# Patient Record
Sex: FEMALE | Race: WHITE | NOT HISPANIC OR LATINO | Employment: FULL TIME | URBAN - METROPOLITAN AREA
[De-identification: names, ages, dates, MRNs, and addresses within clinical notes are randomized per-mention and may not be internally consistent; named-entity substitution may affect disease eponyms.]

---
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
## 2021-01-18 ENCOUNTER — OFFICE VISIT (OUTPATIENT)
Dept: FAMILY MEDICINE | Facility: CLINIC | Age: 21
End: 2021-01-18
Payer: COMMERCIAL

## 2021-01-18 VITALS
OXYGEN SATURATION: 98 % | BODY MASS INDEX: 27.85 KG/M2 | RESPIRATION RATE: 14 BRPM | WEIGHT: 163.5 LBS | HEART RATE: 110 BPM | TEMPERATURE: 98.3 F | SYSTOLIC BLOOD PRESSURE: 100 MMHG | DIASTOLIC BLOOD PRESSURE: 72 MMHG

## 2021-01-18 DIAGNOSIS — Z23 NEED FOR PROPHYLACTIC VACCINATION AND INOCULATION AGAINST INFLUENZA: ICD-10-CM

## 2021-01-18 DIAGNOSIS — Z11.3 ROUTINE SCREENING FOR STI (SEXUALLY TRANSMITTED INFECTION): ICD-10-CM

## 2021-01-18 DIAGNOSIS — N89.8 VAGINAL DISCHARGE: Primary | ICD-10-CM

## 2021-01-18 LAB
HCV AB SERPL QL IA: NONREACTIVE
HIV 1+2 AB+HIV1 P24 AG SERPL QL IA: NONREACTIVE
SPECIMEN SOURCE: NORMAL
T PALLIDUM AB SER QL: NONREACTIVE
WET PREP SPEC: NORMAL

## 2021-01-18 PROCEDURE — 87210 SMEAR WET MOUNT SALINE/INK: CPT | Performed by: FAMILY MEDICINE

## 2021-01-18 PROCEDURE — 87591 N.GONORRHOEAE DNA AMP PROB: CPT | Performed by: FAMILY MEDICINE

## 2021-01-18 PROCEDURE — 90686 IIV4 VACC NO PRSV 0.5 ML IM: CPT | Performed by: FAMILY MEDICINE

## 2021-01-18 PROCEDURE — 90471 IMMUNIZATION ADMIN: CPT | Performed by: FAMILY MEDICINE

## 2021-01-18 PROCEDURE — 86803 HEPATITIS C AB TEST: CPT | Performed by: FAMILY MEDICINE

## 2021-01-18 PROCEDURE — 36415 COLL VENOUS BLD VENIPUNCTURE: CPT | Performed by: FAMILY MEDICINE

## 2021-01-18 PROCEDURE — 86780 TREPONEMA PALLIDUM: CPT | Mod: 90 | Performed by: FAMILY MEDICINE

## 2021-01-18 PROCEDURE — 87389 HIV-1 AG W/HIV-1&-2 AB AG IA: CPT | Performed by: FAMILY MEDICINE

## 2021-01-18 PROCEDURE — 99000 SPECIMEN HANDLING OFFICE-LAB: CPT | Performed by: FAMILY MEDICINE

## 2021-01-18 PROCEDURE — 87491 CHLMYD TRACH DNA AMP PROBE: CPT | Performed by: FAMILY MEDICINE

## 2021-01-18 PROCEDURE — 99213 OFFICE O/P EST LOW 20 MIN: CPT | Mod: 25 | Performed by: FAMILY MEDICINE

## 2021-01-18 NOTE — PATIENT INSTRUCTIONS
Did you know?   I do telehealth (video) visits exclusively on  and .  I still do in-person visits at [facility] on  and .  You can schedule a video visit for follow-up appointments as well as future appointments for certain conditions.  Please see the below link.  [email or web address]    If you have not already done so,  I encourage you to sign up for [email or web address]).  This will allow you to review your results, securely communicate with a provider, and schedule virtual visits as well.    If you are due for a mammogram or colonoscopy please call the [facility] scheduling number: [phone].     To schedule any ordered imaging studies you can call [facility] [phone].

## 2021-01-18 NOTE — PROGRESS NOTES
Assessment & Plan     Vaginal discharge  Normal wet prep.  We'll re-screen for gonorrhea/chlamydia.    - Wet prep  - NEISSERIA GONORRHOEA PCR  - CHLAMYDIA TRACHOMATIS PCR    Routine screening for STI (sexually transmitted infection)  We'll screen for other STI's  - Treponema Abs w Reflex to RPR and Titer  - HIV Antigen Antibody Combo  - Hepatitis C Screen Reflex to HCV RNA Quant and Genotype    Need for prophylactic vaccination and inoculation against influenza  - INFLUENZA VACCINE IM > 6 MONTHS VALENT IIV4 [21759]  - ADMIN 1st VACCINE         No follow-ups on file.    Dinorah Thomas MD  [facility]        Lillian Mckeon is a 20 year old who presents to clinic today for the following health issues     HPI     Vaginal Symptoms      Duration: 1 week     Description  vaginal discharge - creamy yellow and odor    Intensity:  moderate    Accompanying signs and symptoms (fever/dysuria/abdominal or back pain): None    History  Sexually active: yes, single partner, contraception - oral contraceptives (combined)  Possibility of pregnancy: No  Recent antibiotic use: YES- 2 weeks ago    Precipitating or alleviating factors: None    Therapies tried and outcome: rx- for yeast        Was diagnosed with chlamydia Nov 16 and was treated with azithro.  Her partner was also tested (positive) and treated at that time.    Since then she's had several episodes of yeast and BV and has been on several alternating courses of oral diflucan and oral metronidazole.  Most recently this was about 2 weeks ago when she took 2 doses of oral diflucan 3 days apart.    She was re-tested for gonorrhea/chlamydia a month after her positive and she tested negative at that time.  Her partner was also retested and was negative on re-test.  However, she notes that he has complained of dysuria.      She denies vagina itching and irritation but notes a more copious discharge      Review of Systems         Objective    BP  100/72 (BP Location: Left arm, Patient Position: Chair, Cuff Size: Adult Regular)   Pulse 110   Temp 98.3  F (36.8  C) (Oral)   Resp 14   Wt 74.2 kg (163 lb 8 oz)   SpO2 98%   BMI 27.85 kg/m    Body mass index is 27.85 kg/m .  Physical Exam   GEN:  no apparent distress      Results for orders placed or performed in visit on 01/18/21 (from the past 24 hour(s))   Wet prep    Specimen: Vagina   Result Value Ref Range    Specimen Description Vagina     Wet Prep Moderate  WBC'S seen       Wet Prep No Trichomonas seen     Wet Prep No clue cells seen     Wet Prep No yeast seen

## 2021-01-19 NOTE — RESULT ENCOUNTER NOTE
"Hi Linda,  OK, your treponemal antibodies (syphilis blood test) and gonorrhea/chlamydia are all negative.      I can't explain the change in vaginal discharge.  It could be related to the change in the pill.      Given your tendency to develop bacterial vaginosis and yeast infections I recommend that you eat yogurt or take an oral probiotic daily (to keep putting good bacteria into your system).  I also recommend that you practice good vulvar hygiene which includes:   Laundry  Use \"free and clear\" versions of your laundry detergent (such as All or Tide).      If available, add a second rinse when washing your underwear.    Avoid fabric softeners and dryer sheets.      Clothing  Wear white all-cotton underwear.    Do not wear nylon underwear, even if it has a cotton crotch.    Do not wear thongs, even if cotton.    When sleeping avoid wearing underwear. Instead wear loose-fitting bottoms such as cotton boxers or shorts.    Avoid pantyhose.    Avoid tight clothing.      Bathing  Do not use bubble bath, bath salts, scented oils or any other bath products that contain perfume.   For general washing use a gentle soap such as Dove, Neutrogena, Basis, or Cetaphil.    Do not douche.    Feminine products  Avoid pads or tampons that have a deoderant.    Only use pads with a cotton liner (not a nylon mesh weave).      Shaving/waxing  Do not wax, shave with a razor, or use hair removal products on your vulvar area.    Use a scissors or electric clippers to trim pubic hair.    Laser hair removal also is a safe option.      Chronic dampness  Apply Gold Bond body powder or Zeasorb antifungal powder to your vulva and groin area 1-2 times daily to help absorb moisture.    Do not use powders with cornstarch or talcum.  Change underwear during the day as needed.  Avoid daily use of pads when possible.     Dinorah Thomas MD"

## 2021-03-01 ENCOUNTER — OFFICE VISIT (OUTPATIENT)
Dept: FAMILY MEDICINE | Facility: CLINIC | Age: 21
End: 2021-03-01
Payer: COMMERCIAL

## 2021-03-01 VITALS
BODY MASS INDEX: 29.19 KG/M2 | RESPIRATION RATE: 16 BRPM | TEMPERATURE: 98.3 F | SYSTOLIC BLOOD PRESSURE: 115 MMHG | OXYGEN SATURATION: 100 % | HEART RATE: 100 BPM | WEIGHT: 171.4 LBS | DIASTOLIC BLOOD PRESSURE: 72 MMHG

## 2021-03-01 DIAGNOSIS — R30.0 DYSURIA: ICD-10-CM

## 2021-03-01 DIAGNOSIS — B96.89 BV (BACTERIAL VAGINOSIS): Primary | ICD-10-CM

## 2021-03-01 DIAGNOSIS — N76.0 BV (BACTERIAL VAGINOSIS): Primary | ICD-10-CM

## 2021-03-01 DIAGNOSIS — N94.9 FEMALE GENITAL SYMPTOMS: ICD-10-CM

## 2021-03-01 LAB
ALBUMIN UR-MCNC: NEGATIVE MG/DL
APPEARANCE UR: CLEAR
BILIRUB UR QL STRIP: NEGATIVE
COLOR UR AUTO: YELLOW
GLUCOSE UR STRIP-MCNC: NEGATIVE MG/DL
HGB UR QL STRIP: NEGATIVE
KETONES UR STRIP-MCNC: NEGATIVE MG/DL
LEUKOCYTE ESTERASE UR QL STRIP: ABNORMAL
NITRATE UR QL: NEGATIVE
NON-SQ EPI CELLS #/AREA URNS LPF: NORMAL /LPF
PH UR STRIP: 6 PH (ref 5–7)
RBC #/AREA URNS AUTO: NORMAL /HPF
SOURCE: ABNORMAL
SP GR UR STRIP: >1.03 (ref 1–1.03)
SPECIMEN SOURCE: ABNORMAL
UROBILINOGEN UR STRIP-ACNC: 0.2 EU/DL (ref 0.2–1)
WBC #/AREA URNS AUTO: NORMAL /HPF
WET PREP SPEC: ABNORMAL

## 2021-03-01 PROCEDURE — 99213 OFFICE O/P EST LOW 20 MIN: CPT | Performed by: NURSE PRACTITIONER

## 2021-03-01 PROCEDURE — 81001 URINALYSIS AUTO W/SCOPE: CPT | Performed by: NURSE PRACTITIONER

## 2021-03-01 PROCEDURE — 87210 SMEAR WET MOUNT SALINE/INK: CPT | Performed by: NURSE PRACTITIONER

## 2021-03-01 RX ORDER — FLUCONAZOLE 150 MG/1
TABLET ORAL
Qty: 3 TABLET | Refills: 0 | Status: SHIPPED | OUTPATIENT
Start: 2021-03-01 | End: 2021-03-02

## 2021-03-01 RX ORDER — METRONIDAZOLE 500 MG/1
500 TABLET ORAL 2 TIMES DAILY
Qty: 14 TABLET | Refills: 0 | Status: SHIPPED | OUTPATIENT
Start: 2021-03-01 | End: 2021-03-08

## 2021-03-01 RX ORDER — METRONIDAZOLE 7.5 MG/G
GEL VAGINAL
Qty: 70 G | Status: SHIPPED | OUTPATIENT
Start: 2021-03-01 | End: 2021-05-24

## 2021-03-01 NOTE — PROGRESS NOTES
Assessment & Plan     BV (bacterial vaginosis)  Will treat with Flagyl 500 mg BID x 7 days, then start metrogel twice weekly for 4 months for recurrent BV.  Discussed the use and indication of this medication as well as potential side effects.   Consider boric acid suppositories or referral to gyn if this is not helpful.   - metroNIDAZOLE (FLAGYL) 500 MG tablet; Take 1 tablet (500 mg) by mouth 2 times daily for 7 days  - metroNIDAZOLE (METROGEL) 0.75 % vaginal gel; Use 1 applicator vaginally twice weekly for 4 months    Female genital symptoms  To take if she develops symptoms of a yeast infection from metronidazole.  - fluconazole (DIFLUCAN) 150 MG tablet; Take one tablet once, may repeat after 3 days if needed for a total of 3 doses if needed    Dysuria  UA is negative.   - Wet prep  - *UA reflex to Microscopic and Culture ([facility] [facility] (except [place] and [place])  - Urine Microscopic      No follow-ups on file.    Daysi August NP  [facility]    Lillian Mckeon is a 20 year old who presents for the following health issues     HPI       Vaginal Symptoms  Onset/Duration: off and on 2 weeks   Description:  Vaginal Discharge: white   Itching (Pruritis): YES slight  Burning sensation:  YES  Odor: YES  Accompanying Signs & Symptoms:  Urinary symptoms: no  Abdominal pain: no  Fever: no  History:   Sexually active: no  New Partner: no  Possibility of Pregnancy:  no  Recent antibiotic use: no  Previous vaginitis issues: no  Precipitating or alleviating factors: None    She was treated for chlamydia in November and since then, she has had recurrent BV and yeast infections.  She was in last month, negative wet prep.  She has retested negative for Chlamydia.        Review of Systems         Objective    /72   Pulse 100   Temp 98.3  F (36.8  C) (Oral)   Resp 16   Wt 77.7 kg (171 lb 6.4 oz)   SpO2 100%   BMI 29.19 kg/m    Body mass index is 29.19  kg/m .  Physical Exam   GENERAL: healthy, alert and no distress  PSYCH: mentation appears normal, affect normal/bright

## 2021-03-16 DIAGNOSIS — N94.3 PMS (PREMENSTRUAL SYNDROME): ICD-10-CM

## 2021-03-16 DIAGNOSIS — F33.1 MAJOR DEPRESSIVE DISORDER, RECURRENT EPISODE, MODERATE (H): ICD-10-CM

## 2021-03-16 RX ORDER — DULOXETIN HYDROCHLORIDE 30 MG/1
60 CAPSULE, DELAYED RELEASE ORAL DAILY
Status: CANCELLED | OUTPATIENT
Start: 2021-03-16

## 2021-03-17 ENCOUNTER — HOSPITAL ENCOUNTER (EMERGENCY)
Facility: CLINIC | Age: 21
Discharge: HOME | End: 2021-03-17
Attending: PSYCHIATRY & NEUROLOGY | Admitting: PSYCHIATRY & NEUROLOGY
Payer: COMMERCIAL

## 2021-03-17 VITALS
RESPIRATION RATE: 16 BRPM | OXYGEN SATURATION: 100 % | SYSTOLIC BLOOD PRESSURE: 114 MMHG | TEMPERATURE: 97.7 F | DIASTOLIC BLOOD PRESSURE: 72 MMHG | HEART RATE: 82 BPM

## 2021-03-17 DIAGNOSIS — F41.8 DEPRESSION WITH ANXIETY: ICD-10-CM

## 2021-03-17 LAB
AMPHETAMINES UR QL SCN: NEGATIVE
BARBITURATES UR QL: NEGATIVE
BENZODIAZ UR QL: NEGATIVE
CANNABINOIDS UR QL SCN: NEGATIVE
COCAINE UR QL: NEGATIVE
ETHANOL UR QL SCN: NEGATIVE
HCG UR QL: NEGATIVE
OPIATES UR QL SCN: NEGATIVE

## 2021-03-17 PROCEDURE — 99285 EMERGENCY DEPT VISIT HI MDM: CPT | Mod: 25 | Performed by: PSYCHIATRY & NEUROLOGY

## 2021-03-17 PROCEDURE — 81025 URINE PREGNANCY TEST: CPT | Performed by: PSYCHIATRY & NEUROLOGY

## 2021-03-17 PROCEDURE — 90791 PSYCH DIAGNOSTIC EVALUATION: CPT

## 2021-03-17 PROCEDURE — 99283 EMERGENCY DEPT VISIT LOW MDM: CPT | Performed by: PSYCHIATRY & NEUROLOGY

## 2021-03-17 PROCEDURE — 80307 DRUG TEST PRSMV CHEM ANLYZR: CPT | Performed by: PSYCHIATRY & NEUROLOGY

## 2021-03-17 PROCEDURE — 80320 DRUG SCREEN QUANTALCOHOLS: CPT | Performed by: PSYCHIATRY & NEUROLOGY

## 2021-03-17 ASSESSMENT — ENCOUNTER SYMPTOMS
ABDOMINAL PAIN: 0
SHORTNESS OF BREATH: 0
DYSPHORIC MOOD: 1
BACK PAIN: 0
FEVER: 0
CHEST TIGHTNESS: 0
DIZZINESS: 0
HALLUCINATIONS: 0
NERVOUS/ANXIOUS: 1

## 2021-03-18 NOTE — TELEPHONE ENCOUNTER
"Cymbalta historical    Patient was in ER for Suicidal thoughts--see note    \"Also, there was a pharmacy snafu causing her to miss 5 days of her duloxetine.  She got her first dose again today.  She was more anxious, tearful and labile without the medication. \"    Spoke wit patient who gets Cymbalta filled by Psychiatrist. Patient has appt.with her Psychiatrist on 4/1 and also mentions having a therapist that she is following up with. Patient was given refill of Cymbalta and has enough to get to appt. Patient declines need for appt. With PCP at this time.  Patient denies suicidal thoughts and states is doing ok.    Patient encouraged to reach out to clinic RN's if Cymbalta refill issue occurs again and if any SI or questions or concerns.     Patient in agreement with plan and verbalizes understanding.   Thanks!   Steph Petty RN         "

## 2021-03-18 NOTE — ED PROVIDER NOTES
ED Provider Note  [facility]      History     Chief Complaint   Patient presents with     Suicidal     reports suicidal thoughts of overdosing; no plans; worsening depression and anxiety, recent breakup, stress with work, mother is leaving again     The history is provided by the patient and medical records.     Linda Mckinnon is a 20 year old female who comes in due to her feeling bad the last 2 days.  She feels she had a breakdown.  She recently broke up with her boyfriend, has little family support and works long hours.  She got some suicidal thoughts last night, no plan or intent.  She did superficially cut herself on her leg.  It does not need any medical attention.  She is not currently suicidal.  Also, there was a pharmacy snafu causing her to miss 5 days of her duloxetine.  She got her first dose again today.  She was more anxious, tearful and labile without the medication.      Please see the 's assessment in EPIC from today (3/17/21) for further details.    Past Medical History  Past Medical History:   Diagnosis Date     Abdominal pain 8/7/2011    Lab work up negative-better with enema and miralax likely dx constipation      Allergic rhinitis      Depressive disorder      Flank pain 3/20/2014     Generalized anxiety disorder      Major depression      Pain in joint, shoulder region 2/25/2015     Pale 12/26/2013     Vesicoureteral reflux with reflux nephropathy, bilateral     resolved 8/04- normal RNC     Past Surgical History:   Procedure Laterality Date     NO HISTORY OF SURGERY       DASETTA 1/35 1-35 MG-MCG tablet  DULoxetine (CYMBALTA) 30 MG capsule  fluticasone (FLONASE) 50 MCG/ACT nasal spray  hydrOXYzine (VISTARIL) 25 MG capsule  ibuprofen (ADVIL,MOTRIN) 600 MG tablet  metroNIDAZOLE (METROGEL) 0.75 % vaginal gel      Allergies   Allergen Reactions     Penicillins      Body aches, nausea, stomach hurts     Family History  Family History   Problem Relation Age of  Onset     Gallbladder Disease Maternal Grandmother      Colon Cancer Maternal Grandfather      Colon Polyps Maternal Grandfather      Genitourinary Problems No family hx of      Social History   Social History     Tobacco Use     Smoking status: Never Smoker     Smokeless tobacco: Never Used     Tobacco comment: non smoking house    Substance Use Topics     Alcohol use: No     Drug use: No      Past medical history, past surgical history, medications, allergies, family history, and social history were reviewed with the patient. No additional pertinent items.       Review of Systems   Constitutional: Negative for fever.   Eyes: Negative for visual disturbance.   Respiratory: Negative for chest tightness and shortness of breath.    Cardiovascular: Negative for chest pain.   Gastrointestinal: Negative for abdominal pain.   Musculoskeletal: Negative for back pain.   Neurological: Negative for dizziness.   Psychiatric/Behavioral: Positive for dysphoric mood, self-injury and suicidal ideas (off and on). Negative for hallucinations. The patient is nervous/anxious.    All other systems reviewed and are negative.    A complete review of systems was performed with pertinent positives and negatives noted in the HPI, and all other systems negative.    Physical Exam   BP: 131/83  Pulse: 115  Temp: 97.7  F (36.5  C)  Resp: 16  SpO2: 100 %  Physical Exam  Vitals signs and nursing note reviewed.   Constitutional:       Appearance: Normal appearance. She is well-developed.   Cardiovascular:      Rate and Rhythm: Normal rate and regular rhythm.      Heart sounds: Normal heart sounds.   Pulmonary:      Effort: Pulmonary effort is normal. No respiratory distress.      Breath sounds: Normal breath sounds.   Neurological:      Mental Status: She is alert and oriented to person, place, and time.   Psychiatric:         Attention and Perception: Attention and perception normal.         Mood and Affect: Affect normal. Mood is anxious and  depressed.         Speech: Speech normal.         Behavior: Behavior normal. Behavior is cooperative.         Thought Content: Thought content normal. Thought content is not paranoid or delusional. Thought content does not include homicidal or suicidal ideation. Thought content does not include homicidal or suicidal plan.         Cognition and Memory: Cognition and memory normal.         Judgment: Judgment normal.      Comments: Linda is a 19 y/o female who looks her age.  She is well groomed with good eye contact.          ED Course      Procedures             Results for orders placed or performed during the hospital encounter of 03/17/21   Drug abuse screen 6 urine (tox)     Status: None   Result Value Ref Range    Amphetamine Qual Urine Negative NEG^Negative    Barbiturates Qual Urine Negative NEG^Negative    Benzodiazepine Qual Urine Negative NEG^Negative    Cannabinoids Qual Urine Negative NEG^Negative    Cocaine Qual Urine Negative NEG^Negative    Ethanol Qual Urine Negative NEG^Negative    Opiates Qualitative Urine Negative NEG^Negative   HCG qualitative urine     Status: None   Result Value Ref Range    HCG Qual Urine Negative NEG^Negative     Medications - No data to display     Assessments & Plan (with Medical Decision Making)   Linda will be discharged home.  She is not an imminent risk to herself or others.  Her symptoms stem from her withdrawal from duloxetine in addition to her stressors.  She now feels safe and better after talking.  She will follow up with her therapist sooner than scheduled and is confident that her therapist will be able to get her in.  She had her first dose of duloxetine today and each day that she takes it, she should feel better.      I have reviewed the nursing notes. I have reviewed the findings, diagnosis, plan and need for follow up with the patient.    New Prescriptions    No medications on file       Final diagnoses:   Depression with anxiety       --  Wili Palma  MD  [facility] EMERGENCY DEPARTMENT  3/17/2021     Wili Palma MD  03/17/21 [id number]

## 2021-03-18 NOTE — DISCHARGE INSTRUCTIONS
Continue to take your medications    Call you therapist for a sooner appointment than what is scheduled

## 2021-03-22 ENCOUNTER — OFFICE VISIT (OUTPATIENT)
Dept: URGENT CARE | Facility: URGENT CARE | Age: 21
End: 2021-03-22
Payer: COMMERCIAL

## 2021-03-22 VITALS
HEART RATE: 90 BPM | BODY MASS INDEX: 28.95 KG/M2 | SYSTOLIC BLOOD PRESSURE: 114 MMHG | WEIGHT: 170 LBS | TEMPERATURE: 98.3 F | DIASTOLIC BLOOD PRESSURE: 70 MMHG

## 2021-03-22 DIAGNOSIS — N89.8 VAGINAL DISCHARGE: Primary | ICD-10-CM

## 2021-03-22 DIAGNOSIS — R30.0 DYSURIA: ICD-10-CM

## 2021-03-22 LAB
ALBUMIN UR-MCNC: NEGATIVE MG/DL
AMORPH CRY #/AREA URNS HPF: ABNORMAL /HPF
APPEARANCE UR: ABNORMAL
BILIRUB UR QL STRIP: NEGATIVE
COLOR UR AUTO: YELLOW
GLUCOSE UR STRIP-MCNC: NEGATIVE MG/DL
HGB UR QL STRIP: NEGATIVE
KETONES UR STRIP-MCNC: NEGATIVE MG/DL
LEUKOCYTE ESTERASE UR QL STRIP: NEGATIVE
NITRATE UR QL: NEGATIVE
PH UR STRIP: 7 PH (ref 5–7)
RBC #/AREA URNS AUTO: ABNORMAL /HPF
SOURCE: ABNORMAL
SP GR UR STRIP: >1.03 (ref 1–1.03)
SPECIMEN SOURCE: NORMAL
UROBILINOGEN UR STRIP-ACNC: 2 EU/DL (ref 0.2–1)
WBC #/AREA URNS AUTO: ABNORMAL /HPF
WET PREP SPEC: NORMAL

## 2021-03-22 PROCEDURE — 81001 URINALYSIS AUTO W/SCOPE: CPT | Performed by: NURSE PRACTITIONER

## 2021-03-22 PROCEDURE — 99213 OFFICE O/P EST LOW 20 MIN: CPT | Performed by: NURSE PRACTITIONER

## 2021-03-22 PROCEDURE — 87210 SMEAR WET MOUNT SALINE/INK: CPT | Performed by: NURSE PRACTITIONER

## 2021-03-22 PROCEDURE — 87491 CHLMYD TRACH DNA AMP PROBE: CPT | Performed by: NURSE PRACTITIONER

## 2021-03-22 PROCEDURE — 87591 N.GONORRHOEAE DNA AMP PROB: CPT | Performed by: NURSE PRACTITIONER

## 2021-03-23 NOTE — PATIENT INSTRUCTIONS
Await results of Gonorrhea and Chlamydia. No sexual intercourse until these have resulted and you've been treated if needed.

## 2021-03-23 NOTE — PROGRESS NOTES
Chief Complaint   Patient presents with     Urgent Care     Vaginal Problem     concern of BV         ICD-10-CM    1. Vaginal discharge  N89.8 Wet prep     Neisseria gonorrhoeae PCR     Chlamydia trachomatis PCR     Urine Microscopic   2. Dysuria  R30.0 *UA reflex to Microscopic and Culture ([facility] (except [place] and [place])   Will await gonorrhea and Chlamydia results and if these are negative she will follow-up with PCP or GYN provider for further exam and evaluation.  She understands to go to the emergency room if she develops abdominal pain or fever.    25 minutes spent on the date of the encounter doing chart review, history and exam, documentation and further activities as noted above    Medical Decision Making    Differential Diagnosis:  Gyn Problem: Vaginitis:  yeast, trichomonas vaginalis, bacterial vaginosis, herpes vulvitis, gonorrhea, chlamydia, urinary tract infection, STD exposure, Urinary tract infection and Retained tampon      Results for orders placed or performed in visit on 03/22/21 (from the past 24 hour(s))   Wet prep    Specimen: Vagina   Result Value Ref Range    Specimen Description Vagina     Wet Prep No clue cells seen     Wet Prep No yeast seen     Wet Prep No Trichomonas seen     Wet Prep No WBC's seen    *UA reflex to Microscopic and Culture ([facility] (except [place] and [place])    Specimen: Catheterized Urine   Result Value Ref Range    Color Urine Yellow     Appearance Urine Cloudy     Glucose Urine Negative NEG^Negative mg/dL    Bilirubin Urine Negative NEG^Negative    Ketones Urine Negative NEG^Negative mg/dL    Specific Gravity Urine >1.030 1.003 - 1.035    Blood Urine Negative NEG^Negative    pH Urine 7.0 5.0 - 7.0 pH    Protein Albumin Urine Negative NEG^Negative mg/dL    Urobilinogen Urine 2.0 (H) 0.2 - 1.0 EU/dL    Nitrite Urine Negative NEG^Negative    Leukocyte Esterase Urine Negative NEG^Negative    Source Catheterized Urine     Urine Microscopic   Result Value Ref Range    WBC Urine 0 - 5 OTO5^0 - 5 /HPF    RBC Urine O - 2 OTO2^O - 2 /HPF    Amorphous Crystals Many (A) NEG^Negative /HPF       Subjective     Linda Mckinnon is an 20 year old female who presents to clinic today for recurrent BV and she has been taking Metrogel twice a day  Over the last 6 months. She had stopped taking the metro gel a couple of weeks ago. White vaginal discharge that started 3 days ago. Last intercourse was 4 days ago. She did take a Diflucan pill yesterday but has had no improvement. She does have a fishy odor in the perineal area.      ROS: 10 point ROS neg other than the symptoms noted above in the HPI.       Objective    /70   Pulse 90   Temp 98.3  F (36.8  C) (Oral)   Wt 77.1 kg (170 lb)   Breastfeeding No   BMI 28.95 kg/m      Physical Exam       GENERAL APPEARANCE: healthy appearing, alert     EYES: PERRL, EOMI, sclera non-icteric     HENT: oral exam benign, mucus membranes intact, without ulcers or lesions     NECK: no adenopathy or asymmetry, thyroid normal to palpation     RESP: lungs clear to auscultation - no rales, rhonchi or wheezes     CV: regular rates and rhythm, no murmurs, rubs, or gallop     ABDOMEN:  soft, nontender, no HSM or masses and bowel sounds normal     GU_female: Declined     MS: extremities normal- no gross deformities noted; normal muscle tone.     SKIN: no suspicious lesions or rashes    Patient Instructions   Await results of Gonorrhea and Chlamydia. No sexual intercourse until these have resulted and you've been treated if needed.      Concepcion Mike APRN, CNP  [facility] Provider

## 2021-04-07 ENCOUNTER — MYC MEDICAL ADVICE (OUTPATIENT)
Dept: RESEARCH | Facility: CLINIC | Age: 21
End: 2021-04-07

## 2021-04-22 DIAGNOSIS — N92.1 MENOMETRORRHAGIA: ICD-10-CM

## 2021-04-22 DIAGNOSIS — Z30.09 BIRTH CONTROL COUNSELING: ICD-10-CM

## 2021-04-22 DIAGNOSIS — N94.3 PMS (PREMENSTRUAL SYNDROME): ICD-10-CM

## 2021-04-22 RX ORDER — NORETHINDRONE AND ETHINYL ESTRADIOL 1 MG-35MCG
1 KIT ORAL DAILY
Qty: 84 TABLET | Refills: 2 | Status: SHIPPED | OUTPATIENT
Start: 2021-04-22 | End: 2021-06-02

## 2021-04-28 ENCOUNTER — OFFICE VISIT (OUTPATIENT)
Dept: FAMILY MEDICINE | Facility: CLINIC | Age: 21
End: 2021-04-28
Payer: COMMERCIAL

## 2021-04-28 VITALS
TEMPERATURE: 99.6 F | DIASTOLIC BLOOD PRESSURE: 87 MMHG | SYSTOLIC BLOOD PRESSURE: 125 MMHG | HEART RATE: 108 BPM | OXYGEN SATURATION: 97 %

## 2021-04-28 DIAGNOSIS — R06.7 SNEEZING: ICD-10-CM

## 2021-04-28 DIAGNOSIS — J02.9 SORE THROAT: Primary | ICD-10-CM

## 2021-04-28 DIAGNOSIS — R09.81 NASAL CONGESTION: ICD-10-CM

## 2021-04-28 PROCEDURE — 99213 OFFICE O/P EST LOW 20 MIN: CPT | Performed by: FAMILY MEDICINE

## 2021-04-28 PROCEDURE — 99N1174 PR STATISTIC STREP A RAPID: Performed by: FAMILY MEDICINE

## 2021-04-28 PROCEDURE — 87651 STREP A DNA AMP PROBE: CPT | Performed by: FAMILY MEDICINE

## 2021-04-28 ASSESSMENT — ANXIETY QUESTIONNAIRES
6. BECOMING EASILY ANNOYED OR IRRITABLE: MORE THAN HALF THE DAYS
7. FEELING AFRAID AS IF SOMETHING AWFUL MIGHT HAPPEN: SEVERAL DAYS
1. FEELING NERVOUS, ANXIOUS, OR ON EDGE: MORE THAN HALF THE DAYS
2. NOT BEING ABLE TO STOP OR CONTROL WORRYING: MORE THAN HALF THE DAYS
5. BEING SO RESTLESS THAT IT IS HARD TO SIT STILL: SEVERAL DAYS
IF YOU CHECKED OFF ANY PROBLEMS ON THIS QUESTIONNAIRE, HOW DIFFICULT HAVE THESE PROBLEMS MADE IT FOR YOU TO DO YOUR WORK, TAKE CARE OF THINGS AT HOME, OR GET ALONG WITH OTHER PEOPLE: SOMEWHAT DIFFICULT
GAD7 TOTAL SCORE: 12
3. WORRYING TOO MUCH ABOUT DIFFERENT THINGS: MORE THAN HALF THE DAYS

## 2021-04-28 ASSESSMENT — PATIENT HEALTH QUESTIONNAIRE - PHQ9
5. POOR APPETITE OR OVEREATING: MORE THAN HALF THE DAYS
SUM OF ALL RESPONSES TO PHQ QUESTIONS 1-9: 13

## 2021-04-28 NOTE — PATIENT INSTRUCTIONS
"Discharge Instructions for COVID-19 Patients  You have--or may have--COVID-19. Please follow the instructions listed below.   If you have a weakened immune system, discuss with your doctor any other actions you need to take.  How can I protect others?  If you have symptoms (fever, cough, body aches or trouble breathing):    Stay home and away from others (self-isolate) until:  ? Your other symptoms have resolved (gotten better). And   ? You've had no fever--and no medicine that reduces fever--for 1 full day (24 hours). And   ? At least 10 days have passed since your symptoms started. (You may need to wait 20 days. Follow the advice of your care team.)  If you don't show symptoms, but testing showed that you have COVID-19:    Stay home and away from others (self-isolate) until at least 10 days have passed since the date of your first positive COVID-19 test.  During this time    Stay in your own room, even for meals. Use your own bathroom if you can.    Stay away from others in your home. No hugging, kissing or shaking hands. No visitors.    Don't go to work, school or anywhere else.    Clean \"high touch\" surfaces often (doorknobs, counters, handles). Use household cleaning spray or wipes.    You'll find a full list of  on the EPA website: www.epa.gov/pesticide-registration/list-n-disinfectants-use-against-sars-cov-2.    Cover your mouth and nose with a mask or other face covering to avoid spreading germs.    Wash your hands and face often. Use soap and water.    Caregivers in these groups are at risk for severe illness due to COVID-19:  ? People 65 years and older  ? People who live in a nursing home or long-term care facility  ? People with chronic disease (lung, heart, cancer, diabetes, kidney, liver, immunologic)  ? People who have a weakened immune system, including those who:    Are in cancer treatment    Take medicine that weakens the immune system, such as corticosteroids    Had a bone marrow or organ " transplant    Have an immune deficiency    Have poorly controlled HIV or AIDS    Are obese (body mass index of 40 or higher)    Smoke regularly    Caregivers should wear gloves while washing dishes, handling laundry and cleaning bedrooms and bathrooms.    Use caution when washing and drying laundry: Don't shake dirty laundry and use the warmest water setting that you can.    For more tips on managing your health at home, go to www.cdc.gov/coronavirus/2019-ncov/downloads/10Things.pdf.  How can I take care of myself at home?  1. Get lots of rest. Drink extra fluids (unless a doctor has told you not to).  2. Take Tylenol (acetaminophen) for fever or pain. If you have liver or kidney problems, ask your family doctor if it's okay to take Tylenol.   Adults can take either:   ? 650 mg (two 325 mg pills) every 4 to 6 hours, or   ? 1,000 mg (two 500 mg pills) every 8 hours as needed.  ? Note: Don't take more than 3,000 mg in one day. Acetaminophen is found in many medicines (both prescribed and over-the-counter medicines). Read all labels to be sure you don't take too much.   For children, check the Tylenol bottle for the right dose. The dose is based on the child's age or weight.  3. If you have other health problems (like cancer, heart failure, an organ transplant or severe kidney disease): Call your specialty clinic if you don't feel better in the next 2 days.  4. Know when to call 911. Emergency warning signs include:  ? Trouble breathing or shortness of breath  ? Pain or pressure in the chest that doesn't go away  ? Feeling confused like you haven't felt before, or not being able to wake up  ? Bluish-colored lips or face  5. Your doctor may have prescribed a blood thinner medicine. Follow their instructions.  Where can I get more information?     [employer] [facility] - About COVID-19:   [email or web address]    CDC - What to Do If You're Sick:  www.cdc.gov/coronavirus/2019-ncov/about/steps-when-sick.html    CDC - Ending Home Isolation: www.cdc.gov/coronavirus/2019-ncov/hcp/disposition-in-home-patients.html    CDC - Caring for Someone: www.cdc.gov/coronavirus/2019-ncov/if-you-are-sick/care-for-someone.html    [facility] - Interim Guidance for Hospital Discharge to Home: www.health.[facility].[place]./diseases/coronavirus/hcp/hospdischarge.pdf    Below are the COVID-19 hotlines at the [place] Department of Health ([facility]). Interpreters are available.  ? For health questions: Call 977-418-7632 or 1-549.497.4139 (7 a.m. to 7 p.m.)  ? For questions about schools and childcare: Call 571-013-1168 or 1-567.190.5313 (7 a.m. to 7 p.m.)    For informational purposes only. Not to replace the advice of your health care provider. Clinically reviewed by Dr. Tarik Kelley.   Copyright   2020 [facility]. All rights reserved. [employer] 721582 - REV 01/05/21.

## 2021-04-28 NOTE — LETTER
[facility] [address] [place] [address]  Phone: [phone]    April 28, 2021        Linda Mckinnon  [address] [facility] [address]          To whom it may concern:    RE: Linda Mckinnon    Patient was seen and treated today at our clinic and missed work due to illness. Please excuse her absence.  She has been given the following instructions:   If you have symptoms (fever, cough, body aches or trouble breathing):    Stay home and away from others (self-isolate) until:  ? Your other symptoms have resolved (gotten better). And   ? You've had no fever--and no medicine that reduces fever--for 1 full day (24 hours). And   ? At least 10 days have passed since your symptoms started.     Please contact me for questions or concerns.      Sincerely,        Dinorah Pathak MD

## 2021-04-28 NOTE — PROGRESS NOTES
"    Assessment & Plan     Sore throat, nasal congestion   ?suspect possible allergies vs viral infection. She declined COVID-19 test today, but says she will complete a saliva test in the community. Recommended self-isolation until test result returns. Can use OTC antihistamine prn   - Streptococcus A Rapid Scr w Reflx to PCR  - Group A Streptococcus PCR Throat Swab              Patient Instructions   Discharge Instructions for COVID-19 Patients  You have--or may have--COVID-19. Please follow the instructions listed below.   If you have a weakened immune system, discuss with your doctor any other actions you need to take.  How can I protect others?  If you have symptoms (fever, cough, body aches or trouble breathing):    Stay home and away from others (self-isolate) until:  ? Your other symptoms have resolved (gotten better). And   ? You've had no fever--and no medicine that reduces fever--for 1 full day (24 hours). And   ? At least 10 days have passed since your symptoms started. (You may need to wait 20 days. Follow the advice of your care team.)  If you don't show symptoms, but testing showed that you have COVID-19:    Stay home and away from others (self-isolate) until at least 10 days have passed since the date of your first positive COVID-19 test.  During this time    Stay in your own room, even for meals. Use your own bathroom if you can.    Stay away from others in your home. No hugging, kissing or shaking hands. No visitors.    Don't go to work, school or anywhere else.    Clean \"high touch\" surfaces often (doorknobs, counters, handles). Use household cleaning spray or wipes.    You'll find a full list of  on the EPA website: www.epa.gov/pesticide-registration/list-n-disinfectants-use-against-sars-cov-2.    Cover your mouth and nose with a mask or other face covering to avoid spreading germs.    Wash your hands and face often. Use soap and water.    Caregivers in these groups are at risk for severe " illness due to COVID-19:  ? People 65 years and older  ? People who live in a nursing home or long-term care facility  ? People with chronic disease (lung, heart, cancer, diabetes, kidney, liver, immunologic)  ? People who have a weakened immune system, including those who:    Are in cancer treatment    Take medicine that weakens the immune system, such as corticosteroids    Had a bone marrow or organ transplant    Have an immune deficiency    Have poorly controlled HIV or AIDS    Are obese (body mass index of 40 or higher)    Smoke regularly    Caregivers should wear gloves while washing dishes, handling laundry and cleaning bedrooms and bathrooms.    Use caution when washing and drying laundry: Don't shake dirty laundry and use the warmest water setting that you can.    For more tips on managing your health at home, go to www.cdc.gov/coronavirus/2019-ncov/downloads/10Things.pdf.  How can I take care of myself at home?  1. Get lots of rest. Drink extra fluids (unless a doctor has told you not to).  2. Take Tylenol (acetaminophen) for fever or pain. If you have liver or kidney problems, ask your family doctor if it's okay to take Tylenol.   Adults can take either:   ? 650 mg (two 325 mg pills) every 4 to 6 hours, or   ? 1,000 mg (two 500 mg pills) every 8 hours as needed.  ? Note: Don't take more than 3,000 mg in one day. Acetaminophen is found in many medicines (both prescribed and over-the-counter medicines). Read all labels to be sure you don't take too much.   For children, check the Tylenol bottle for the right dose. The dose is based on the child's age or weight.  3. If you have other health problems (like cancer, heart failure, an organ transplant or severe kidney disease): Call your specialty clinic if you don't feel better in the next 2 days.  4. Know when to call 911. Emergency warning signs include:  ? Trouble breathing or shortness of breath  ? Pain or pressure in the chest that doesn't go  away  ? Feeling confused like you haven't felt before, or not being able to wake up  ? Bluish-colored lips or face  5. Your doctor may have prescribed a blood thinner medicine. Follow their instructions.  Where can I get more information?    [facility] - About COVID-19:   [email or web address]    CDC - What to Do If You're Sick: www.cdc.gov/coronavirus/2019-ncov/about/steps-when-sick.html    CDC - Ending Home Isolation: www.cdc.gov/coronavirus/2019-ncov/hcp/disposition-in-home-patients.html    CDC - Caring for Someone: www.cdc.gov/coronavirus/2019-ncov/if-you-are-sick/care-for-someone.html    [facility] - Interim Guidance for Hospital Discharge to Home: www.health.[facility].[place]/diseases/coronavirus/hcp/hospdischarge.pdf    Below are the COVID-19 hotlines at the [place] Department of Health ([facility]). Interpreters are available.  ? For health questions: Call 251-125-4130 or 1-466.246.2536 (7 a.m. to 7 p.m.)  ? For questions about schools and childcare: Call 914-082-7635 or 1-486.307.9486 (7 a.m. to 7 p.m.)    For informational purposes only. Not to replace the advice of your health care provider. Clinically reviewed by Dr. Tarik Kelley.   Copyright   2020 [facility]. All rights reserved. [employer] [id number] - REV 01/05/21.        No follow-ups on file.    Dinorah Pathak MD, MD  [facility]    Lillian Mckeon is a 20 year old who presents for the following health issues     HPI     Acute Illness  Acute illness concerns: sore throat on right side   Onset/Duration: 2 days   Symptoms:  Fever: no  Chills/Sweats: no  Headache (location?): no  Sinus Pressure: YES-   Conjunctivitis:  no  Ear Pain: YES: right  Rhinorrhea: no  Congestion: YES  Sore Throat: YES  Cough: no  Wheeze: no  Decreased Appetite: no  Nausea: no  Vomiting: no  Diarrhea: no  Dysuria/Freq.: no  Dysuria or Hematuria: no  Fatigue/Achiness: no  Sick/Strep Exposure: no  Therapies tried and outcome:  ibuprofen helps somewhat   Additional: no covid testing done    She thinks she has allergies and wonders about treatment. She has nasal congestion, sore throat, post nasal drip, sneezing. No fever. Reports she will not do a nasal swab for COVID testing as she is very anxious about doing so. Would like to do PCR saliva test if she needs a test.        Review of Systems          Objective    /87 (BP Location: Left arm, Patient Position: Sitting, Cuff Size: Adult Regular)   Pulse 108   Temp 99.6  F (37.6  C) (Tympanic)   SpO2 97%   There is no height or weight on file to calculate BMI.  Physical Exam   GENERAL: healthy, alert and no distress  EYES: Eyes grossly normal to inspection, PERRL and conjunctivae and sclerae normal  HENT: ear canals and TM's normal, nose and mouth without ulcers or lesions  NECK: no adenopathy, no asymmetry, masses, or scars and thyroid normal to palpation  RESP: lungs clear to auscultation - no rales, rhonchi or wheezes  CV: regular rate and rhythm, normal S1 S2, no S3 or S4, no murmur, click or rub, no peripheral edema and peripheral pulses strong

## 2021-04-29 ASSESSMENT — ANXIETY QUESTIONNAIRES: GAD7 TOTAL SCORE: 12

## 2021-04-30 ENCOUNTER — OFFICE VISIT (OUTPATIENT)
Dept: URGENT CARE | Facility: URGENT CARE | Age: 21
End: 2021-04-30
Payer: COMMERCIAL

## 2021-04-30 VITALS
HEIGHT: 62 IN | WEIGHT: 170 LBS | TEMPERATURE: 98.1 F | RESPIRATION RATE: 15 BRPM | DIASTOLIC BLOOD PRESSURE: 74 MMHG | HEART RATE: 115 BPM | BODY MASS INDEX: 31.28 KG/M2 | SYSTOLIC BLOOD PRESSURE: 102 MMHG | OXYGEN SATURATION: 97 %

## 2021-04-30 DIAGNOSIS — N89.8 VAGINAL DISCHARGE: ICD-10-CM

## 2021-04-30 DIAGNOSIS — Z11.3 SCREEN FOR STD (SEXUALLY TRANSMITTED DISEASE): Primary | ICD-10-CM

## 2021-04-30 LAB
SPECIMEN SOURCE: NORMAL
WET PREP SPEC: NORMAL

## 2021-04-30 PROCEDURE — 99213 OFFICE O/P EST LOW 20 MIN: CPT | Mod: 25 | Performed by: PHYSICIAN ASSISTANT

## 2021-04-30 PROCEDURE — 87210 SMEAR WET MOUNT SALINE/INK: CPT | Performed by: PHYSICIAN ASSISTANT

## 2021-04-30 PROCEDURE — 87491 CHLMYD TRACH DNA AMP PROBE: CPT | Performed by: PHYSICIAN ASSISTANT

## 2021-04-30 PROCEDURE — 96372 THER/PROPH/DIAG INJ SC/IM: CPT | Performed by: PHYSICIAN ASSISTANT

## 2021-04-30 PROCEDURE — 87591 N.GONORRHOEAE DNA AMP PROB: CPT | Performed by: PHYSICIAN ASSISTANT

## 2021-04-30 RX ORDER — AZITHROMYCIN 500 MG/1
250 TABLET, FILM COATED ORAL ONCE
Status: CANCELLED | OUTPATIENT
Start: 2021-04-30 | End: 2021-04-30

## 2021-04-30 RX ORDER — AZITHROMYCIN 250 MG/1
1000 TABLET, FILM COATED ORAL ONCE
Qty: 4 TABLET | Refills: 0 | Status: SHIPPED | OUTPATIENT
Start: 2021-04-30 | End: 2021-04-30

## 2021-04-30 ASSESSMENT — MIFFLIN-ST. JEOR: SCORE: 1499.36

## 2021-04-30 NOTE — PROGRESS NOTES
Screen for STD (sexually transmitted disease)  - Chlamydia trachomatis PCR  - Neisseria gonorrhoeae PCR  - cefTRIAXone (ROCEPHIN) injection 500 mg  - azithromycin (ZITHROMAX) 250 MG tablet; Take 4 tablets (1,000 mg) by mouth once for 1 dose    Vaginal discharge  - Wet prep  - cefTRIAXone (ROCEPHIN) injection 500 mg  - azithromycin (ZITHROMAX) 250 MG tablet; Take 4 tablets (1,000 mg) by mouth once for 1 dose    20 minutes spent on the date of the encounter doing chart review, history and exam, documentation and further activities per the note     See Patient Instructions  Patient Instructions     Patient Education     Testing for Suspected STI  Your symptoms suggest that you may have a sexually transmitted infection (STI). The most common bacteria that cause STIs are chlamydia and gonorrhea. Both are highly contagious. They are passed by sexual contact with an infected partner.  Symptoms start within 1 to 3 weeks after exposure. There is usually a discharge from the penis or vagina and burning during urination. Many women with one of these infections will have only mild symptoms or no symptoms at all early in the disease.  Tests have been done to show if you have an infection with chlamydia or gonorrhea. These infections can be treated and cured with antibiotic medicine.  Home care  Don't have sex until you know that your test result is negative.  Call for the results of your tests. If the test is positive, contact your healthcare provider, local clinic, or local public health department to be treated, or return to our facility.    You will be prescribed antibiotic medicine. Be sure to take all of the antibiotic as prescribed until it is gone or you are told to stop. Keep taking it even if you feel better.    Both you and your sexual partner or partners need to be treated, even if the partner has no symptoms.    Don't have sex until both you and your partner or partners have finished all antibiotic medicine and you  are told that you are no longer contagious.  Learn about safe sex practices and use these in the future. The safest sex is with a partner who has tested negative for STIs and only has sex with you. Condoms can help prevent the spread of gonorrhea and chlamydia, but are not a guarantee.  Follow-up care  Follow up with your healthcare provider, or as advised. Call as directed for the results of your test. This is to be sure the infection has cleared. Follow up with your provider or the public health department for complete STI screening, including HIV testing, and to consider ways to prevent HIV. For more information about STIs, call the CDC information line at 468-905-8303 or look at the CDC website online.  When to seek medical advice  Call your healthcare provider if any of these occur:    Fever of 100.4 F (38.0 C) or higher, or as directed    New pain in your lower belly (abdomen) or back or pain that gets worse    Unexpected vaginal bleeding    Weakness, dizziness, or fainting    Repeated vomiting    Inability to urinate because of pain    Rash or joint pain    Painful open sores on the penis, or in or around the outer vagina or rectum    Enlarged painful lumps (lymph nodes) in the groin    Testicle pain or scrotal swelling in men  [employer] last reviewed this educational content on 4/1/2018 2000-2021 [employer]. All rights reserved. This information is not intended as a substitute for professional medical care. Always follow your healthcare professional's instructions.               DAYANA Guzman [facility]    Subjective   20 year old year old who presents to clinic today for the following health issues:    Urgent Care and Vaginal Problem       HPI     Vaginal Symptoms  Onset/Duration: 1 week  Description:  Vaginal Discharge: Yellow   Itching (Pruritis): no  Burning sensation:  no  Odor: no  Accompanying Signs & Symptoms:  Urinary symptoms: no  Abdominal pain:  no  Fever: no  History:   Sexually active: YES  New Partner: New and multiple partners  Possibility of Pregnancy:  YES (Birth control)  Recent antibiotic use: no  Previous vaginitis issues: BV 2 months ago)  Precipitating or alleviating factors: None  Therapies tried and outcome: none        Review of Systems   Review of Systems   See HPI       Objective    Temp: 98.1  F (36.7  C) Temp src: Oral BP: 102/74 Pulse: 115   Resp: 15 SpO2: 97 %       Physical Exam   Physical Exam  Constitutional:       General: She is not in acute distress.     Appearance: Normal appearance. She is normal weight. She is not ill-appearing, toxic-appearing or diaphoretic.   HENT:      Head: Normocephalic and atraumatic.   Cardiovascular:      Rate and Rhythm: Normal rate and regular rhythm.      Pulses: Normal pulses.      Heart sounds: Normal heart sounds.   Pulmonary:      Effort: Pulmonary effort is normal. No respiratory distress.      Breath sounds: Normal breath sounds.   Neurological:      General: No focal deficit present.      Mental Status: She is alert and oriented to person, place, and time. Mental status is at baseline.   Psychiatric:         Mood and Affect: Mood normal.         Behavior: Behavior normal.         Thought Content: Thought content normal.         Judgment: Judgment normal.          Results for orders placed or performed in visit on 04/30/21 (from the past 24 hour(s))   Wet prep    Specimen: Vagina   Result Value Ref Range    Specimen Description Vagina     Wet Prep Moderate  WBC'S seen       Wet Prep No Trichomonas seen     Wet Prep No clue cells seen     Wet Prep No yeast seen

## 2021-04-30 NOTE — PATIENT INSTRUCTIONS
Patient Education     Testing for Suspected STI  Your symptoms suggest that you may have a sexually transmitted infection (STI). The most common bacteria that cause STIs are chlamydia and gonorrhea. Both are highly contagious. They are passed by sexual contact with an infected partner.  Symptoms start within 1 to 3 weeks after exposure. There is usually a discharge from the penis or vagina and burning during urination. Many women with one of these infections will have only mild symptoms or no symptoms at all early in the disease.  Tests have been done to show if you have an infection with chlamydia or gonorrhea. These infections can be treated and cured with antibiotic medicine.  Home care  Don't have sex until you know that your test result is negative.  Call for the results of your tests. If the test is positive, contact your healthcare provider, local clinic, or local public health department to be treated, or return to our facility.    You will be prescribed antibiotic medicine. Be sure to take all of the antibiotic as prescribed until it is gone or you are told to stop. Keep taking it even if you feel better.    Both you and your sexual partner or partners need to be treated, even if the partner has no symptoms.    Don't have sex until both you and your partner or partners have finished all antibiotic medicine and you are told that you are no longer contagious.  Learn about safe sex practices and use these in the future. The safest sex is with a partner who has tested negative for STIs and only has sex with you. Condoms can help prevent the spread of gonorrhea and chlamydia, but are not a guarantee.  Follow-up care  Follow up with your healthcare provider, or as advised. Call as directed for the results of your test. This is to be sure the infection has cleared. Follow up with your provider or the public health department for complete STI screening, including HIV testing, and to consider ways to prevent HIV.  For more information about STIs, call the CDC information line at 840-924-0764 or look at the CDC website online.  When to seek medical advice  Call your healthcare provider if any of these occur:    Fever of 100.4 F (38.0 C) or higher, or as directed    New pain in your lower belly (abdomen) or back or pain that gets worse    Unexpected vaginal bleeding    Weakness, dizziness, or fainting    Repeated vomiting    Inability to urinate because of pain    Rash or joint pain    Painful open sores on the penis, or in or around the outer vagina or rectum    Enlarged painful lumps (lymph nodes) in the groin    Testicle pain or scrotal swelling in men  "[employer] last reviewed this educational content on 4/1/2018 2000-2021 [employer]. All rights reserved. This information is not intended as a substitute for professional medical care. Always follow your healthcare professional's instructions.

## 2021-05-01 ENCOUNTER — TELEPHONE (OUTPATIENT)
Dept: URGENT CARE | Facility: URGENT CARE | Age: 21
End: 2021-05-01

## 2021-05-01 NOTE — TELEPHONE ENCOUNTER
Pt was called and message was left requesting return call to clinic for lab results.  Francisca Hogue MA

## 2021-05-01 NOTE — TELEPHONE ENCOUNTER
Spoke with Linda and gave her the following message:    Rafael Back PA-C [facility] Nurse             Please advise patient of positive Chlamydia test.  Treatment is appropriate. Refrain from sexual activity for 1 week.  Follow up with PCP if symptoms persist.      She said she understands and will inform her partners.      Lillian Torres RN

## 2021-05-04 ENCOUNTER — TELEPHONE (OUTPATIENT)
Dept: FAMILY MEDICINE | Facility: CLINIC | Age: 21
End: 2021-05-04

## 2021-05-04 NOTE — TELEPHONE ENCOUNTER
----- Message from Margarita Lux sent at 5/4/2021  2:04 PM CDT -----  Regarding: [facility] Reportable  Chlamydia - Positive for C. trachomatis

## 2021-05-24 ENCOUNTER — OFFICE VISIT (OUTPATIENT)
Dept: URGENT CARE | Facility: URGENT CARE | Age: 21
End: 2021-05-24
Payer: COMMERCIAL

## 2021-05-24 VITALS
HEART RATE: 101 BPM | WEIGHT: 165 LBS | DIASTOLIC BLOOD PRESSURE: 84 MMHG | HEIGHT: 62 IN | OXYGEN SATURATION: 97 % | BODY MASS INDEX: 30.36 KG/M2 | SYSTOLIC BLOOD PRESSURE: 124 MMHG | TEMPERATURE: 98.6 F

## 2021-05-24 DIAGNOSIS — Z11.3 SCREEN FOR STD (SEXUALLY TRANSMITTED DISEASE): ICD-10-CM

## 2021-05-24 DIAGNOSIS — N89.8 VAGINAL IRRITATION: Primary | ICD-10-CM

## 2021-05-24 LAB
ALBUMIN UR-MCNC: NEGATIVE MG/DL
APPEARANCE UR: CLEAR
BILIRUB UR QL STRIP: NEGATIVE
COLOR UR AUTO: YELLOW
GLUCOSE UR STRIP-MCNC: NEGATIVE MG/DL
HCG UR QL: NEGATIVE
HGB UR QL STRIP: NEGATIVE
KETONES UR STRIP-MCNC: ABNORMAL MG/DL
LEUKOCYTE ESTERASE UR QL STRIP: NEGATIVE
NITRATE UR QL: NEGATIVE
PH UR STRIP: 5 PH (ref 5–7)
SOURCE: ABNORMAL
SP GR UR STRIP: >1.03 (ref 1–1.03)
SPECIMEN SOURCE: NORMAL
UROBILINOGEN UR STRIP-ACNC: 0.2 EU/DL (ref 0.2–1)
WET PREP SPEC: NORMAL

## 2021-05-24 PROCEDURE — 36415 COLL VENOUS BLD VENIPUNCTURE: CPT | Performed by: FAMILY MEDICINE

## 2021-05-24 PROCEDURE — 87491 CHLMYD TRACH DNA AMP PROBE: CPT | Performed by: PHYSICIAN ASSISTANT

## 2021-05-24 PROCEDURE — 86780 TREPONEMA PALLIDUM: CPT | Mod: 90 | Performed by: FAMILY MEDICINE

## 2021-05-24 PROCEDURE — 99000 SPECIMEN HANDLING OFFICE-LAB: CPT | Performed by: FAMILY MEDICINE

## 2021-05-24 PROCEDURE — 86803 HEPATITIS C AB TEST: CPT | Performed by: FAMILY MEDICINE

## 2021-05-24 PROCEDURE — 87210 SMEAR WET MOUNT SALINE/INK: CPT | Performed by: PHYSICIAN ASSISTANT

## 2021-05-24 PROCEDURE — 99213 OFFICE O/P EST LOW 20 MIN: CPT | Performed by: FAMILY MEDICINE

## 2021-05-24 PROCEDURE — 81003 URINALYSIS AUTO W/O SCOPE: CPT | Performed by: PHYSICIAN ASSISTANT

## 2021-05-24 PROCEDURE — 87591 N.GONORRHOEAE DNA AMP PROB: CPT | Performed by: PHYSICIAN ASSISTANT

## 2021-05-24 PROCEDURE — 87389 HIV-1 AG W/HIV-1&-2 AB AG IA: CPT | Performed by: FAMILY MEDICINE

## 2021-05-24 PROCEDURE — 81025 URINE PREGNANCY TEST: CPT | Performed by: FAMILY MEDICINE

## 2021-05-24 ASSESSMENT — ENCOUNTER SYMPTOMS
FLANK PAIN: 0
ABDOMINAL PAIN: 0
FEVER: 0
BACK PAIN: 0
DYSURIA: 0
NAUSEA: 0
FREQUENCY: 0
VOMITING: 0
HEMATURIA: 0

## 2021-05-24 ASSESSMENT — MIFFLIN-ST. JEOR: SCORE: 1476.69

## 2021-05-24 NOTE — PATIENT INSTRUCTIONS
Fluids, as discussed.    We will notify you of your test results, as discussed.    Follow-up if your symptoms worsen or do not improve over the next week.    Follow-up with OB/GYN, as recommended.

## 2021-05-24 NOTE — PROGRESS NOTES
Assessment & Plan     Linda was seen today for urgent care, std and vaginal problem.    Diagnoses and all orders for this visit:    Vaginal irritation, mild x 1 week.  History of recurrent bacterial vaginosis.  Wet prep and Urinalysis are negative today.  Follow-up Chlamydia and Gonorrhea tests are negative.  -     Neisseria gonorrhoeae PCR  -     Chlamydia trachomatis PCR  -     Wet prep  -     *UA reflex to Microscopic and Culture ([place] [facility] (except [place] and [place])  -     HCG qualitative urine    Screen for STD (sexually transmitted disease)  -     Treponema Abs w Reflex to RPR and Titer  -     HIV Antigen Antibody Combo  -     Hepatitis C Screen Reflex to HCV RNA Quant and Genotype    Discussed risks and benefits of treatment strategies.    Use condoms, as discussed.    Follow-up with OB/GYN regarding OCP concerns and recurrent bacterial vaginosis, as discussed.    Patient Instructions     Fluids, as discussed.    We will notify you of your test results, as discussed.    Follow-up if your symptoms worsen or do not improve over the next week.    Follow-up with OB/GYN, as recommended.      Return for Follow up, as noted in patient instructions.    Yara Blake MD  [facility]     Linda is a 20 year old female, who presents to clinic today for the following health issues:    Chief Complaint   Patient presents with     Urgent Care     Pt in clinic c/o vaginal burning and discharge.     STD     Vaginal Problem       HPI - Vaginal irritation/discharge    The patient presents with a recurrence of vaginal irritation and whitish/fishy vaginal discharge (possibly due to her OCP), starting ~ 1 week ago.  Patient notes an occasional burning sensation (vaginal introitus), but she denies vaginal pruritis.  Symptoms are mild/stable.  Patient has had 10 new partners in the past year.  HIV, Syphilis, and HCV tests were negative/nonreactive  "1/18/2021.  Patient in on an OCP (LMP ~1 month ago), denying risk for pregnancy.  Patient uses condoms occasionally.  Patient has a history of recurrent bacterial vaginosis, which she states has been treated ~7 times in the past year.    The patient was last evaluated 4/30/2021 (last month), at which time her Chlamydia test was positive.  Patient was treated with Rocephin and Azithromycin 4/30/2021.    Review of Systems   Constitutional: Negative for fever.   Gastrointestinal: Negative for abdominal pain, nausea and vomiting.   Genitourinary: Negative for dysuria, flank pain, frequency, hematuria and vaginal bleeding.   Musculoskeletal: Negative for back pain.         Objective    /84   Pulse 101   Temp 98.6  F (37  C) (Oral)   Ht 1.575 m (5' 2\")   Wt 74.8 kg (165 lb)   SpO2 97%   BMI 30.18 kg/m    Physical Exam   GENERAL APPEARANCE:  Awake, alert, and in no acute distress.  PSYCHIATRIC:  Pleasant, smiling affect.  HEENT:  Sclera anicteric.  No conjunctivitis.    Neck:  Spontaneous full range of motion.  HEART:  Regular rate and rhythm.  No murmurs, rubs, or gallops.  LUNGS:  No respiratory distress.  No wheezes, rales, or rhonchi   ABDOMEN:   Soft and not distended, without organomegaly.  No tenderness to palpation.  BACK: No CVA tenderness.  :  Discussed with and declined by patient.  SKIN:  No rash.    LABORATORY:  Office Visit on 05/24/2021   Component Date Value Ref Range Status     Specimen Descrip 05/24/2021 Vagina   Final     N Gonorrhea PCR 05/24/2021 Negative  NEG^Negative Final    Comment: Negative for N. gonorrhoeae rRNA by transcription mediated amplification.  A negative result by transcription mediated amplification does not preclude   the presence of N. gonorrhoeae infection because results are dependent on   proper and adequate collection, absence of inhibitors, and sufficient rRNA to   be detected.       Specimen Description 05/24/2021 Vagina   Final     Chlamydia Trachomatis PCR " 05/24/2021 Negative  NEG^Negative Final    Comment: Negative for C. trachomatis rRNA by transcription mediated amplification.  A negative result by transcription mediated amplification does not preclude   the presence of C. trachomatis infection because results are dependent on   proper and adequate collection, absence of inhibitors, and sufficient rRNA to   be detected.       Specimen Description 05/24/2021 Vagina   Final     Wet Prep 05/24/2021 No Trichomonas seen   Final     Wet Prep 05/24/2021 No clue cells seen   Final     Wet Prep 05/24/2021 No yeast seen   Final     Wet Prep 05/24/2021    Final                    Value:Few  WBC'S seen       Color Urine 05/24/2021 Yellow   Final     Appearance Urine 05/24/2021 Clear   Final     Glucose Urine 05/24/2021 Negative  NEG^Negative mg/dL Final     Bilirubin Urine 05/24/2021 Negative  NEG^Negative Final     Ketones Urine 05/24/2021 Trace* NEG^Negative mg/dL Final     Specific Gravity Urine 05/24/2021 >1.030  1.003 - 1.035 Final     Blood Urine 05/24/2021 Negative  NEG^Negative Final     pH Urine 05/24/2021 5.0  5.0 - 7.0 pH Final     Protein Albumin Urine 05/24/2021 Negative  NEG^Negative mg/dL Final     Urobilinogen Urine 05/24/2021 0.2  0.2 - 1.0 EU/dL Final     Nitrite Urine 05/24/2021 Negative  NEG^Negative Final     Leukocyte Esterase Urine 05/24/2021 Negative  NEG^Negative Final     Source 05/24/2021 Midstream Urine   Final     HCG Qual Urine 05/24/2021 Negative  NEG^Negative Final    Comment: This test is for screening purposes.  Results should be interpreted along with   the clinical picture.  Confirmation testing is available if warranted by   ordering [id number], HCG Quantitative Pregnancy.

## 2021-05-25 LAB
C TRACH DNA SPEC QL NAA+PROBE: NEGATIVE
HCV AB SERPL QL IA: NONREACTIVE
HIV 1+2 AB+HIV1 P24 AG SERPL QL IA: NONREACTIVE
N GONORRHOEA DNA SPEC QL NAA+PROBE: NEGATIVE
SPECIMEN SOURCE: NORMAL
SPECIMEN SOURCE: NORMAL
T PALLIDUM AB SER QL: NONREACTIVE

## 2021-05-30 ENCOUNTER — OFFICE VISIT (OUTPATIENT)
Dept: URGENT CARE | Facility: URGENT CARE | Age: 21
End: 2021-05-30
Payer: COMMERCIAL

## 2021-05-30 VITALS
HEART RATE: 90 BPM | BODY MASS INDEX: 29.23 KG/M2 | HEIGHT: 63 IN | WEIGHT: 165 LBS | TEMPERATURE: 98.4 F | RESPIRATION RATE: 12 BRPM | DIASTOLIC BLOOD PRESSURE: 74 MMHG | SYSTOLIC BLOOD PRESSURE: 110 MMHG

## 2021-05-30 DIAGNOSIS — N89.8 VAGINAL DISCHARGE: ICD-10-CM

## 2021-05-30 DIAGNOSIS — B37.31 YEAST INFECTION OF THE VAGINA: Primary | ICD-10-CM

## 2021-05-30 LAB
ALBUMIN UR-MCNC: NEGATIVE MG/DL
APPEARANCE UR: CLEAR
BILIRUB UR QL STRIP: ABNORMAL
COLOR UR AUTO: YELLOW
GLUCOSE UR STRIP-MCNC: NEGATIVE MG/DL
HGB UR QL STRIP: NEGATIVE
KETONES UR STRIP-MCNC: NEGATIVE MG/DL
LEUKOCYTE ESTERASE UR QL STRIP: NEGATIVE
NITRATE UR QL: NEGATIVE
PH UR STRIP: 5.5 PH (ref 5–7)
SOURCE: ABNORMAL
SP GR UR STRIP: >1.03 (ref 1–1.03)
SPECIMEN SOURCE: ABNORMAL
UROBILINOGEN UR STRIP-ACNC: 0.2 EU/DL (ref 0.2–1)
WET PREP SPEC: ABNORMAL

## 2021-05-30 PROCEDURE — 99213 OFFICE O/P EST LOW 20 MIN: CPT | Performed by: FAMILY MEDICINE

## 2021-05-30 PROCEDURE — 87591 N.GONORRHOEAE DNA AMP PROB: CPT | Performed by: FAMILY MEDICINE

## 2021-05-30 PROCEDURE — 87491 CHLMYD TRACH DNA AMP PROBE: CPT | Performed by: FAMILY MEDICINE

## 2021-05-30 PROCEDURE — 81003 URINALYSIS AUTO W/O SCOPE: CPT | Performed by: FAMILY MEDICINE

## 2021-05-30 PROCEDURE — 87210 SMEAR WET MOUNT SALINE/INK: CPT | Performed by: FAMILY MEDICINE

## 2021-05-30 RX ORDER — FLUCONAZOLE 150 MG/1
150 TABLET ORAL ONCE
Qty: 2 TABLET | Refills: 0 | Status: SHIPPED | OUTPATIENT
Start: 2021-05-30 | End: 2021-05-30

## 2021-05-30 ASSESSMENT — MIFFLIN-ST. JEOR: SCORE: 1492.57

## 2021-05-30 NOTE — PATIENT INSTRUCTIONS
The chlamydia and gonorrhea tests will be sent out.  If either comes back positive, we will call you to start treatment.   Avoid intercourse until you get your results and finish treatment, if indicated.

## 2021-05-30 NOTE — PROGRESS NOTES
"SUBJECTIVE:  Linda Mckinnon is a 20 year old female who  presents today for STD testing or possibly BV.  She has noticed a cloudy white/watery vaginal discharge and odor since having intercourse 3 days ago.  Her partner removed the condom during the encounter.  Both she and he had tested positive for chlamydia at the beginning of the month and both were treated.   They aren't in a monogamous relationship and he has slept with others since treatment.   She did have a full panel of STD tests done on 5/24/21, all negative at that time.        OBJECTIVE:  /74   Pulse 90   Temp 98.4  F (36.9  C) (Oral)   Resp 12   Ht 1.6 m (5' 3\")   Wt 74.8 kg (165 lb)   BMI 29.23 kg/m    GENERAL APPEARANCE: healthy, alert and no distress      Labs:   Results for orders placed or performed in visit on 05/30/21 (from the past 24 hour(s))   *UA reflex to Microscopic and Culture ([facility] (except [place] and [place])    Specimen: Midstream Urine   Result Value Ref Range    Color Urine Yellow     Appearance Urine Clear     Glucose Urine Negative NEG^Negative mg/dL    Bilirubin Urine Small (A) NEG^Negative    Ketones Urine Negative NEG^Negative mg/dL    Specific Gravity Urine >1.030 1.003 - 1.035    Blood Urine Negative NEG^Negative    pH Urine 5.5 5.0 - 7.0 pH    Protein Albumin Urine Negative NEG^Negative mg/dL    Urobilinogen Urine 0.2 0.2 - 1.0 EU/dL    Nitrite Urine Negative NEG^Negative    Leukocyte Esterase Urine Negative NEG^Negative    Source Midstream Urine    Wet prep    Specimen: Vagina   Result Value Ref Range    Specimen Description Vagina     Wet Prep Rare  Yeast seen   (A)     Wet Prep Few  WBC'S seen       Wet Prep No clue cells seen     Wet Prep No Trichomonas seen        ASSESSMENT/PLAN:     ICD-10-CM    1. Yeast infection of the vagina  B37.3 fluconazole (DIFLUCAN) 150 MG tablet   2. Vaginal discharge  N89.8 *UA reflex to Microscopic and Culture ([facility] (except [facility]" [place] and [place])     Wet prep     Chlamydia trachomatis PCR     Neisseria gonorrhoeae PCR       We discussed the expected course of the infection and medication.  GC and chlamydia pending.  Advised to return to care if symptoms not improving as expected, do not resolve completely, or if any new or worsening symptoms develop.    Patient Instructions   The chlamydia and gonorrhea tests will be sent out.  If either comes back positive, we will call you to start treatment.   Avoid intercourse until you get your results and finish treatment, if indicated.

## 2021-06-02 ENCOUNTER — VIRTUAL VISIT (OUTPATIENT)
Dept: FAMILY MEDICINE | Facility: CLINIC | Age: 21
End: 2021-06-02
Payer: COMMERCIAL

## 2021-06-02 VITALS — BODY MASS INDEX: 29.23 KG/M2 | WEIGHT: 165 LBS

## 2021-06-02 DIAGNOSIS — F33.41 RECURRENT MAJOR DEPRESSIVE DISORDER, IN PARTIAL REMISSION (H): ICD-10-CM

## 2021-06-02 DIAGNOSIS — Z30.09 GENERAL COUNSELING FOR PRESCRIPTION OF ORAL CONTRACEPTIVES: Primary | ICD-10-CM

## 2021-06-02 PROCEDURE — 99213 OFFICE O/P EST LOW 20 MIN: CPT | Mod: GT | Performed by: FAMILY MEDICINE

## 2021-06-02 RX ORDER — DULOXETIN HYDROCHLORIDE 60 MG/1
60 CAPSULE, DELAYED RELEASE ORAL DAILY
Qty: 90 CAPSULE | Refills: 1 | Status: SHIPPED | OUTPATIENT
Start: 2021-06-02

## 2021-06-02 RX ORDER — DROSPIRENONE AND ETHINYL ESTRADIOL 0.02-3(28)
1 KIT ORAL DAILY
Qty: 84 TABLET | Refills: 3 | Status: SHIPPED | OUTPATIENT
Start: 2021-06-02 | End: 2021-12-13

## 2021-06-02 ASSESSMENT — ANXIETY QUESTIONNAIRES
GAD7 TOTAL SCORE: 10
3. WORRYING TOO MUCH ABOUT DIFFERENT THINGS: SEVERAL DAYS
2. NOT BEING ABLE TO STOP OR CONTROL WORRYING: SEVERAL DAYS
1. FEELING NERVOUS, ANXIOUS, OR ON EDGE: MORE THAN HALF THE DAYS
7. FEELING AFRAID AS IF SOMETHING AWFUL MIGHT HAPPEN: SEVERAL DAYS
6. BECOMING EASILY ANNOYED OR IRRITABLE: MORE THAN HALF THE DAYS
4. TROUBLE RELAXING: SEVERAL DAYS
GAD7 TOTAL SCORE: 10
5. BEING SO RESTLESS THAT IT IS HARD TO SIT STILL: MORE THAN HALF THE DAYS
GAD7 TOTAL SCORE: 10
7. FEELING AFRAID AS IF SOMETHING AWFUL MIGHT HAPPEN: SEVERAL DAYS

## 2021-06-02 ASSESSMENT — PATIENT HEALTH QUESTIONNAIRE - PHQ9
10. IF YOU CHECKED OFF ANY PROBLEMS, HOW DIFFICULT HAVE THESE PROBLEMS MADE IT FOR YOU TO DO YOUR WORK, TAKE CARE OF THINGS AT HOME, OR GET ALONG WITH OTHER PEOPLE: SOMEWHAT DIFFICULT
SUM OF ALL RESPONSES TO PHQ QUESTIONS 1-9: 11
SUM OF ALL RESPONSES TO PHQ QUESTIONS 1-9: 11

## 2021-06-02 NOTE — PATIENT INSTRUCTIONS
Did you know?   I do telehealth (video) visits exclusively on Wednesdays.  I still do in-person visits at [facility] ([phone]) on Mondays, Tuesdays and Thursdays.  You can schedule a video visit for follow-up appointments as well as future appointments for certain conditions.  Please see the below link.  [email or web address]

## 2021-06-02 NOTE — PROGRESS NOTES
Linda is a 20 year old who is being evaluated via a billable video visit.      How would you like to obtain your AVS? Rosibel  If the video visit is dropped, the invitation should be resent by: Text to cell phone: [phone]  Will anyone else be joining your video visit? No      Video Start Time: 9:53 AM    Assessment & Plan     General counseling for prescription of oral contraceptives  I think she'd be a good candidate for Mirena.  She is hesitant because her mom had heavy, irregular bleeding for a few months after getting an IUD.  I discussed that initially after insertion bleeding can ekaterina heavy and irregular but after several months it starts to subside.  She will consider scheduling with GYN to discuss this further.  In the mean time I'll have her try Connor and explained that it has a different progesterone and only 4 placebo pills per pack so that her periods may be shorter.  We could also consider a continuous pill like Seasonale but I am concerned she may have a lot of break-through bleeding.  - drospirenone-ethinyl estradiol (CONNOR) 3-0.02 MG tablet  Dispense: 84 tablet; Refill: 3    Recurrent major depressive disorder, in partial remission (H)  Fair control in a patient who has tried many medications and feels this is working best for her.  We'll have her continue on duloxetine 60 mg daily.  I did discuss the option of trying 60 mg BID but she'd prefer not to increase the dose due to her tendency for discontinuation syndrome.  - DULoxetine (CYMBALTA) 60 MG capsule  Dispense: 90 capsule; Refill: 1     Patient Instructions     Did you know?   I do telehealth (video) visits exclusively on Wednesdays.  I still do in-person visits at [facility] ([phone]) on Mondays, Tuesdays and Thursdays.  You can schedule a video visit for follow-up appointments as well as future appointments for certain conditions.  Please see the below  link.  [email or web address]        No follow-ups on file.    Dinorah Thomas MD  [facility]        Lillian Mckeon is a 20 year old who presents for the following health issues     HPI       Contraception  Pt would like to speak about switching birth control.   She gets heavy periods with a lot of cramping on this pill.  She has been on many different OCP's since age 12 due to menorrhagia - including tricyclic pills (O-N 7/7/7), low-dose pills (Alesse, and Loestrin), and traditional pills (Ortho Cyclen).  She is currently on generic Ortho-Novum 1/35.        Depression and Anxiety Follow-Up  She has been seeing a psychiatrist (Dr. Willson at [facility]) and feels she is doing well on Cymbalta 60 mg daily.  She has tried many different medications and definitely feels that Cymbalta has worked the best for her.  She has trouble with the psychiatrist's office hours and would like to switch her prescribing back to primary care.  She does forget to take it on occasion and gets discontinuation symptoms if she misses 2 doses (sometimes after missing only one dose).    Social History     Tobacco Use     Smoking status: Never Smoker     Smokeless tobacco: Never Used     Tobacco comment: non smoking house    Substance Use Topics     Alcohol use: No     Drug use: No     PHQ 10/15/2020 4/28/2021 6/2/2021   PHQ-9 Total Score 12 13 11   Q9: Thoughts of better off dead/self-harm past 2 weeks Not at all Not at all Not at all   PHQ-A Total Score - - -   PHQ-A Suicide Ideation past 2 weeks - - -     LÓPEZ-7 SCORE 10/15/2020 4/28/2021 6/2/2021   Total Score - - -   Total Score - - 10 (moderate anxiety)   Total Score 11 12 10     Last PHQ-9 6/2/2021   1.  Little interest or pleasure in doing things 2   2.  Feeling down, depressed, or hopeless 2   3.  Trouble falling or staying asleep, or sleeping too much 1   4.  Feeling tired or having little energy 2   5.   Poor appetite or overeating 1   6.  Feeling bad about yourself 1   7.  Trouble concentrating 2   8.  Moving slowly or restless 0   Q9: Thoughts of better off dead/self-harm past 2 weeks 0   PHQ-9 Total Score 11   Difficulty at work, home, or with people -     LÓPEZ-7  6/2/2021   1. Feeling nervous, anxious, or on edge 2   2. Not being able to stop or control worrying 1   3. Worrying too much about different things 1   4. Trouble relaxing 1   5. Being so restless that it is hard to sit still 2   6. Becoming easily annoyed or irritable 2   7. Feeling afraid, as if something awful might happen 1   LÓPEZ-7 Total Score 10   If you checked any problems, how difficult have they made it for you to do your work, take care of things at home, or get along with other people? -       Suicide Assessment Five-step Evaluation and Treatment (SAFE-T)       Review of Systems         Objective       Vitals:  No vitals were obtained today due to virtual visit.    Physical Exam   GENERAL: Healthy, alert and no distress  EYES: Eyes grossly normal to inspection.  No discharge or erythema, or obvious scleral/conjunctival abnormalities.  RESP: No audible wheeze, cough, or visible cyanosis.  No visible retractions or increased work of breathing.    SKIN: Visible skin clear. No significant rash, abnormal pigmentation or lesions.  NEURO: Cranial nerves grossly intact.  Mentation and speech appropriate for age.  PSYCH: Mentation appears normal, affect normal/bright, judgement and insight intact, normal speech and appearance well-groomed.          Video-Visit Details    Type of service:  Video Visit    Video End Time:10:11 AM    Originating Location (pt. Location): Home    Distant Location (provider location):  [facility]     Platform used for Video Visit: SqueezeCMM

## 2021-06-03 ASSESSMENT — ANXIETY QUESTIONNAIRES: GAD7 TOTAL SCORE: 10

## 2021-06-03 ASSESSMENT — PATIENT HEALTH QUESTIONNAIRE - PHQ9: SUM OF ALL RESPONSES TO PHQ QUESTIONS 1-9: 11

## 2021-09-05 ENCOUNTER — OFFICE VISIT (OUTPATIENT)
Dept: URGENT CARE | Facility: URGENT CARE | Age: 21
End: 2021-09-05
Payer: COMMERCIAL

## 2021-09-05 VITALS
RESPIRATION RATE: 16 BRPM | TEMPERATURE: 97.8 F | HEART RATE: 91 BPM | SYSTOLIC BLOOD PRESSURE: 112 MMHG | OXYGEN SATURATION: 98 % | WEIGHT: 165 LBS | BODY MASS INDEX: 29.23 KG/M2 | DIASTOLIC BLOOD PRESSURE: 60 MMHG

## 2021-09-05 DIAGNOSIS — B96.89 BACTERIAL VAGINITIS: Primary | ICD-10-CM

## 2021-09-05 DIAGNOSIS — N76.0 BACTERIAL VAGINITIS: Primary | ICD-10-CM

## 2021-09-05 DIAGNOSIS — N89.8 VAGINAL ITCHING: ICD-10-CM

## 2021-09-05 LAB
ALBUMIN UR-MCNC: NEGATIVE MG/DL
APPEARANCE UR: CLEAR
BILIRUB UR QL STRIP: NEGATIVE
CLUE CELLS: PRESENT
COLOR UR AUTO: YELLOW
GLUCOSE UR STRIP-MCNC: NEGATIVE MG/DL
HGB UR QL STRIP: NEGATIVE
KETONES UR STRIP-MCNC: NEGATIVE MG/DL
LEUKOCYTE ESTERASE UR QL STRIP: NEGATIVE
NITRATE UR QL: NEGATIVE
PH UR STRIP: 6.5 [PH] (ref 5–7)
SP GR UR STRIP: 1.02 (ref 1–1.03)
TRICHOMONAS, WET PREP: ABNORMAL
UROBILINOGEN UR STRIP-ACNC: 1 E.U./DL
WBC'S/HIGH POWER FIELD, WET PREP: ABNORMAL
YEAST, WET PREP: ABNORMAL

## 2021-09-05 PROCEDURE — 86696 HERPES SIMPLEX TYPE 2 TEST: CPT | Performed by: NURSE PRACTITIONER

## 2021-09-05 PROCEDURE — 87389 HIV-1 AG W/HIV-1&-2 AB AG IA: CPT | Performed by: NURSE PRACTITIONER

## 2021-09-05 PROCEDURE — 87210 SMEAR WET MOUNT SALINE/INK: CPT | Performed by: NURSE PRACTITIONER

## 2021-09-05 PROCEDURE — 81003 URINALYSIS AUTO W/O SCOPE: CPT | Performed by: NURSE PRACTITIONER

## 2021-09-05 PROCEDURE — 87491 CHLMYD TRACH DNA AMP PROBE: CPT | Performed by: NURSE PRACTITIONER

## 2021-09-05 PROCEDURE — 86695 HERPES SIMPLEX TYPE 1 TEST: CPT | Performed by: NURSE PRACTITIONER

## 2021-09-05 PROCEDURE — 99214 OFFICE O/P EST MOD 30 MIN: CPT | Performed by: NURSE PRACTITIONER

## 2021-09-05 PROCEDURE — 86803 HEPATITIS C AB TEST: CPT | Performed by: NURSE PRACTITIONER

## 2021-09-05 PROCEDURE — 86780 TREPONEMA PALLIDUM: CPT | Performed by: NURSE PRACTITIONER

## 2021-09-05 PROCEDURE — 36415 COLL VENOUS BLD VENIPUNCTURE: CPT | Performed by: NURSE PRACTITIONER

## 2021-09-05 PROCEDURE — 87591 N.GONORRHOEAE DNA AMP PROB: CPT | Performed by: NURSE PRACTITIONER

## 2021-09-05 RX ORDER — METRONIDAZOLE 7.5 MG/G
1 GEL VAGINAL DAILY
Qty: 30 G | Refills: 0 | Status: SHIPPED | OUTPATIENT
Start: 2021-09-05 | End: 2021-09-10

## 2021-09-05 NOTE — PROGRESS NOTES
Chief Complaint   Patient presents with     Vaginal Problem     Pt is having vaginal discharge and itching X 4 days         ICD-10-CM    1. Bacterial vaginitis  N76.0 metroNIDAZOLE (METROGEL) 0.75 % vaginal gel    B96.89    2. Vaginal itching  N89.8 Wet prep - Clinic Collect     UA macro with reflex to Microscopic and Culture - Clinc Collect     NEISSERIA GONORRHOEA PCR     CHLAMYDIA TRACHOMATIS PCR     Treponema Abs w Reflex to RPR and Titer     HIV Antigen Antibody Combo     Hepatitis C antibody     Herpes Simplex Virus 1 and 2 IgG     NEISSERIA GONORRHOEA PCR     CHLAMYDIA TRACHOMATIS PCR     Treponema Abs w Reflex to RPR and Titer     HIV Antigen Antibody Combo     Hepatitis C antibody     Herpes Simplex Virus 1 and 2 IgG   Patient had new sexual partner 4 months ago and they are monogamous but she has not been tested for STDs since they have been together.  She was in agreement with testing a full panel at this time.  She will be treated for bacterial vaginosis and we will await the results of other tests.       Results for orders placed or performed in visit on 09/05/21 (from the past 24 hour(s))   Wet prep - Clinic Collect    Specimen: Vagina; Swab   Result Value Ref Range    Trichomonas Absent Absent    Yeast Absent Absent    Clue Cells Present (A) Absent    WBCs/high power field 2+ (A) None   UA macro with reflex to Microscopic and Culture - Clinc Collect    Specimen: Urine, Midstream   Result Value Ref Range    Color Urine Yellow Colorless, Straw, Light Yellow, Yellow    Appearance Urine Clear Clear    Glucose Urine Negative Negative mg/dL    Bilirubin Urine Negative Negative    Ketones Urine Negative Negative mg/dL    Specific Gravity Urine 1.025 1.003 - 1.035    Blood Urine Negative Negative    pH Urine 6.5 5.0 - 7.0    Protein Albumin Urine Negative Negative mg/dL    Urobilinogen Urine 1.0 0.2, 1.0 E.U./dL    Nitrite Urine Negative Negative    Leukocyte Esterase Urine Negative Negative    Narrative     Microscopic not indicated       Subjective     Linda Mckinnon is an 21 year old female who presents to clinic today for vaginal itching and white discharge for several days.  She does have a history of frequent BV, last treated in May officially but reports she thinks she had it last month and used some leftover MetroGel to treat it.  Symptoms did go away at that time.    ROS: 10 point ROS neg other than the symptoms noted above in the HPI.       Objective    /60   Pulse 91   Temp 97.8  F (36.6  C) (Oral)   Resp 16   Wt 74.8 kg (165 lb)   SpO2 98%   Breastfeeding No   BMI 29.23 kg/m      Physical Exam       GENERAL APPEARANCE: healthy appearing, alert     ABDOMEN:  soft, nontender, no HSM or masses and bowel sounds normal     MS: extremities normal- no gross deformities noted; normal muscle tone.     SKIN: no suspicious lesions or rashes    Patient Instructions   You have a bacterial infection called bacterial vaginosis/vagintis. This is not a sexually transmitted disease and your partner does not need to be treated.    Please take the medications as prescribed and do not have sexual intercourse until all the medication is gone.  Do not drink alcohol while taking this medications or you will become very sick.     This medication may interfere with birth control medications. While taking the antibiotic I would recommend using a second method of birth control.    Follow up if symptoms fail to improve or worsen.                ELADIO Loera, CNP  [facility] Provider

## 2021-09-05 NOTE — PATIENT INSTRUCTIONS
You have a bacterial infection called bacterial vaginosis/vagintis. This is not a sexually transmitted disease and your partner does not need to be treated.    Please take the medications as prescribed and do not have sexual intercourse until all the medication is gone.  Do not drink alcohol while taking this medications or you will become very sick.     This medication may interfere with birth control medications. While taking the antibiotic I would recommend using a second method of birth control.    Follow up if symptoms fail to improve or worsen.

## 2021-09-06 LAB
C TRACH DNA SPEC QL NAA+PROBE: NEGATIVE
N GONORRHOEA DNA SPEC QL NAA+PROBE: NEGATIVE

## 2021-09-07 LAB
HCV AB SERPL QL IA: NONREACTIVE
HIV 1+2 AB+HIV1 P24 AG SERPL QL IA: NONREACTIVE
HSV1 IGG SERPL QL IA: 0.12 INDEX
HSV1 IGG SERPL QL IA: NORMAL
HSV2 IGG SERPL QL IA: 0.2 INDEX
HSV2 IGG SERPL QL IA: NORMAL
T PALLIDUM AB SER QL: NONREACTIVE

## 2021-09-14 ENCOUNTER — OFFICE VISIT (OUTPATIENT)
Dept: URGENT CARE | Facility: URGENT CARE | Age: 21
End: 2021-09-14
Payer: COMMERCIAL

## 2021-09-14 VITALS
RESPIRATION RATE: 16 BRPM | HEART RATE: 92 BPM | TEMPERATURE: 98.9 F | DIASTOLIC BLOOD PRESSURE: 64 MMHG | WEIGHT: 165 LBS | OXYGEN SATURATION: 100 % | SYSTOLIC BLOOD PRESSURE: 110 MMHG | BODY MASS INDEX: 29.23 KG/M2

## 2021-09-14 DIAGNOSIS — B37.31 CANDIDAL VULVOVAGINITIS: ICD-10-CM

## 2021-09-14 DIAGNOSIS — N76.0 BV (BACTERIAL VAGINOSIS): Primary | ICD-10-CM

## 2021-09-14 DIAGNOSIS — B96.89 BV (BACTERIAL VAGINOSIS): Primary | ICD-10-CM

## 2021-09-14 LAB
CLUE CELLS: PRESENT
TRICHOMONAS, WET PREP: ABNORMAL
WBC'S/HIGH POWER FIELD, WET PREP: ABNORMAL
YEAST, WET PREP: ABNORMAL

## 2021-09-14 PROCEDURE — 99214 OFFICE O/P EST MOD 30 MIN: CPT | Performed by: PHYSICIAN ASSISTANT

## 2021-09-14 PROCEDURE — 87210 SMEAR WET MOUNT SALINE/INK: CPT | Performed by: PHYSICIAN ASSISTANT

## 2021-09-14 RX ORDER — FLUCONAZOLE 150 MG/1
150 TABLET ORAL ONCE
Qty: 1 TABLET | Refills: 0 | Status: SHIPPED | OUTPATIENT
Start: 2021-09-14 | End: 2021-09-14

## 2021-09-14 RX ORDER — METRONIDAZOLE 7.5 MG/G
1 GEL VAGINAL 2 TIMES DAILY
Qty: 50 G | Refills: 0 | Status: SHIPPED | OUTPATIENT
Start: 2021-09-14 | End: 2021-09-19

## 2021-09-14 RX ORDER — METRONIDAZOLE 500 MG/1
500 TABLET ORAL 2 TIMES DAILY
Qty: 14 TABLET | Refills: 0 | Status: SHIPPED | OUTPATIENT
Start: 2021-09-14 | End: 2021-09-21

## 2021-09-14 NOTE — PATIENT INSTRUCTIONS
Patient Education     Bacterial Vaginosis    You have a vaginal infection called bacterial vaginosis (BV). Both good and bad bacteria are present in a healthy vagina. BV occurs when these bacteria get out of balance. The number of bad bacteria increase. And the number of good bacteria decrease. BV is linked with sexual activity, but it's not a sexually transmitted infection (STI).   BV may or may not cause symptoms. If symptoms do occur, they can include:     Thin, gray, milky-white, or sometimes green discharge    Unpleasant odor or  fishy  smell    Itching, burning, or pain in or around the vagina  It is not known what causes BV, but certain factors can make the problem more likely. These can include:     Douching    Spermicides    Use of antibiotics    Change in hormone levels with pregnancy, breastfeeding, or menopause    Having sex with a new partner    Having sex with more than one partner  BV will sometimes go away on its own. But treatment is often advised. This is because untreated BV can raise the risk of more serious health problems such as:     Pelvic inflammatory disease (PID)     delivery (giving birth to a baby early if you re pregnant)    HIV and some other sexually transmitted infections (STIs)    Infection after surgery on the reproductive organs  Home care  General care    BV is most often treated with medicines called antibiotics. These may be given as pills or as a vaginal cream. If antibiotics are prescribed, be sure to use them exactly as directed. And complete all of the medicine, even if your symptoms go away.    Don't douche or having sex during treatment.    If you have sex with a female partner, ask your healthcare provider if she should also be treated.  Prevention    Don't douche.    Don't have sex. If you do have sex, then take steps to lower your risk:  ? Use condoms when having sex.  ? Limit the number of sex partners you have.    Follow-up care  Follow up with your  healthcare provider, or as advised.   When to get medical advice  Call your healthcare provider right away if:     You have a fever of 100.4 F (38 C) or higher, or as directed by your provider.    Your symptoms get worse, or they don t go away within a few days of starting treatment.    You have new pain in the lower belly or pelvic region.    You have side effects that bother you or a reaction to the pills or cream you re prescribed.    You or any of your sex partners have new symptoms, such as a rash, joint pain, or sores.  [employer] last reviewed this educational content on 6/1/2020 2000-2021 [employer]. All rights reserved. This information is not intended as a substitute for professional medical care. Always follow your healthcare professional's instructions.

## 2021-09-14 NOTE — PROGRESS NOTES
"    Assessment & Plan     BV (bacterial vaginosis)  Wet prep positive for BV  Failed treated on metrogel  Start oral and intravaginal flagyl  - Wet prep - Clinic Collect  - metroNIDAZOLE (FLAGYL) 500 MG tablet; Take 1 tablet (500 mg) by mouth 2 times daily for 7 days  - metroNIDAZOLE (METROGEL) 0.75 % vaginal gel; Place 1 applicator (5 g) vaginally 2 times daily for 5 days    Candidal vulvovaginitis  Diflucan for yeast infections  Follow up as needed  - fluconazole (DIFLUCAN) 150 MG tablet; Take 1 tablet (150 mg) by mouth once for 1 dose     BMI:   Estimated body mass index is 29.23 kg/m  as calculated from the following:    Height as of 5/30/21: 1.6 m (5' 3\").    Weight as of this encounter: 74.8 kg (165 lb).       No follow-ups on file.    Shmuel Walker PA-C  [facility] GLEN Mckeon is a 21 year old who presents for the following health issues     HPI     Vaginal discharge  Hx of BV    Review of Systems   Constitutional, HEENT, cardiovascular, pulmonary, gi and gu systems are negative, except as otherwise noted.      Objective    /64   Pulse 92   Temp 98.9  F (37.2  C)   Resp 16   Wt 74.8 kg (165 lb)   SpO2 100%   BMI 29.23 kg/m    Body mass index is 29.23 kg/m .  Physical Exam   GENERAL: healthy, alert and no distress  ABDOMEN: soft, nontender, no hepatosplenomegaly, no masses and bowel sounds normal   (female): deferred  MS: no gross musculoskeletal defects noted, no edema      Results for orders placed or performed in visit on 09/14/21   Wet prep - Clinic Collect     Status: Abnormal    Specimen: Vagina; Swab   Result Value Ref Range    Trichomonas Absent Absent    Yeast Absent Absent    Clue Cells Present (A) Absent    WBCs/high power field 3+ (A) None             "

## 2021-09-18 ENCOUNTER — HEALTH MAINTENANCE LETTER (OUTPATIENT)
Age: 21
End: 2021-09-18

## 2021-09-20 ENCOUNTER — TELEPHONE (OUTPATIENT)
Dept: FAMILY MEDICINE | Facility: CLINIC | Age: 21
End: 2021-09-20

## 2021-09-20 DIAGNOSIS — N89.8 VAGINAL DISCHARGE: Primary | ICD-10-CM

## 2021-09-20 NOTE — TELEPHONE ENCOUNTER
Dr. James,     Patient was seen in UC on 9/14/21- started the medication on 9/14/21- Took the Flagyl 1/2 tablets on the 14th and 2 tablets on the 15th. She stopped the Flagyl because it upset her stomach and caused nausea and continued the Metrogel.   Still has burning/irration vagina area, denies itching, denies burning with urination, vaginal discharge.      Patient is wondering if she needs to be seen again or can you switch her medication.     Can we leave a detailed message on this number? YES  Phone number patient can be reached at: Cell number on file:    Telephone Information:   Mobile [phone]       Mariola Flores RN  [facility]3] Triage

## 2021-10-11 ENCOUNTER — OFFICE VISIT (OUTPATIENT)
Dept: URGENT CARE | Facility: URGENT CARE | Age: 21
End: 2021-10-11
Payer: COMMERCIAL

## 2021-10-11 VITALS
DIASTOLIC BLOOD PRESSURE: 62 MMHG | HEIGHT: 62 IN | RESPIRATION RATE: 14 BRPM | TEMPERATURE: 98.3 F | BODY MASS INDEX: 29.44 KG/M2 | HEART RATE: 70 BPM | SYSTOLIC BLOOD PRESSURE: 100 MMHG | WEIGHT: 160 LBS

## 2021-10-11 DIAGNOSIS — N89.8 VAGINAL IRRITATION: ICD-10-CM

## 2021-10-11 DIAGNOSIS — B96.89 BACTERIAL VAGINOSIS: Primary | ICD-10-CM

## 2021-10-11 DIAGNOSIS — N76.0 BACTERIAL VAGINOSIS: Primary | ICD-10-CM

## 2021-10-11 LAB
ALBUMIN UR-MCNC: NEGATIVE MG/DL
APPEARANCE UR: CLEAR
BACTERIA #/AREA URNS HPF: ABNORMAL /HPF
BILIRUB UR QL STRIP: NEGATIVE
CLUE CELLS: PRESENT
COLOR UR AUTO: YELLOW
GLUCOSE UR STRIP-MCNC: NEGATIVE MG/DL
HGB UR QL STRIP: NEGATIVE
KETONES UR STRIP-MCNC: NEGATIVE MG/DL
LEUKOCYTE ESTERASE UR QL STRIP: ABNORMAL
NITRATE UR QL: NEGATIVE
PH UR STRIP: 6 [PH] (ref 5–7)
RBC #/AREA URNS AUTO: ABNORMAL /HPF
SP GR UR STRIP: 1.01 (ref 1–1.03)
SQUAMOUS #/AREA URNS AUTO: ABNORMAL /LPF
TRICHOMONAS, WET PREP: ABNORMAL
UROBILINOGEN UR STRIP-ACNC: 0.2 E.U./DL
WBC #/AREA URNS AUTO: ABNORMAL /HPF
WBC'S/HIGH POWER FIELD, WET PREP: ABNORMAL
YEAST, WET PREP: ABNORMAL

## 2021-10-11 PROCEDURE — 81001 URINALYSIS AUTO W/SCOPE: CPT

## 2021-10-11 PROCEDURE — 99213 OFFICE O/P EST LOW 20 MIN: CPT | Performed by: PHYSICIAN ASSISTANT

## 2021-10-11 PROCEDURE — 87210 SMEAR WET MOUNT SALINE/INK: CPT

## 2021-10-11 ASSESSMENT — MIFFLIN-ST. JEOR: SCORE: 1449.01

## 2021-10-11 NOTE — PATIENT INSTRUCTIONS
Patient Education     Bacterial Vaginosis    You have a vaginal infection called bacterial vaginosis (BV). Both good and bad bacteria are present in a healthy vagina. BV occurs when these bacteria get out of balance. The number of bad bacteria increase. And the number of good bacteria decrease. BV is linked with sexual activity, but it's not a sexually transmitted infection (STI).   BV may or may not cause symptoms. If symptoms do occur, they can include:     Thin, gray, milky-white, or sometimes green discharge    Unpleasant odor or  fishy  smell    Itching, burning, or pain in or around the vagina  It is not known what causes BV, but certain factors can make the problem more likely. These can include:     Douching    Spermicides    Use of antibiotics    Change in hormone levels with pregnancy, breastfeeding, or menopause    Having sex with a new partner    Having sex with more than one partner  BV will sometimes go away on its own. But treatment is often advised. This is because untreated BV can raise the risk of more serious health problems such as:     Pelvic inflammatory disease (PID)     delivery (giving birth to a baby early if you re pregnant)    HIV and some other sexually transmitted infections (STIs)    Infection after surgery on the reproductive organs  Home care  General care    BV is most often treated with medicines called antibiotics. These may be given as pills or as a vaginal cream. If antibiotics are prescribed, be sure to use them exactly as directed. And complete all of the medicine, even if your symptoms go away.    Don't douche or having sex during treatment.    If you have sex with a female partner, ask your healthcare provider if she should also be treated.  Prevention    Don't douche.    Don't have sex. If you do have sex, then take steps to lower your risk:  ? Use condoms when having sex.  ? Limit the number of sex partners you have.    Follow-up care  Follow up with your  healthcare provider, or as advised.   When to get medical advice  Call your healthcare provider right away if:     You have a fever of 100.4 F (38 C) or higher, or as directed by your provider.    Your symptoms get worse, or they don t go away within a few days of starting treatment.    You have new pain in the lower belly or pelvic region.    You have side effects that bother you or a reaction to the pills or cream you re prescribed.    You or any of your sex partners have new symptoms, such as a rash, joint pain, or sores.  [employer] last reviewed this educational content on 6/1/2020 2000-2021 [employer]. All rights reserved. This information is not intended as a substitute for professional medical care. Always follow your healthcare professional's instructions.

## 2021-10-11 NOTE — PROGRESS NOTES
Vaginal irritation  - UA macro with reflex to Microscopic and Culture - Clinc Collect  - Wet prep - Clinic Collect  - Urine Microscopic  - clindamycin (CLEOCIN) 100 MG vaginal suppository; Place 1 suppository (100 mg) vaginally At Bedtime for 7 days    Bacterial vaginosis  - clindamycin (CLEOCIN) 100 MG vaginal suppository; Place 1 suppository (100 mg) vaginally At Bedtime for 7 days    20 minutes spent on the date of the encounter doing chart review, history and exam, documentation and further activities per the note     See Patient Instructions  Patient Instructions     Patient Education     Bacterial Vaginosis    You have a vaginal infection called bacterial vaginosis (BV). Both good and bad bacteria are present in a healthy vagina. BV occurs when these bacteria get out of balance. The number of bad bacteria increase. And the number of good bacteria decrease. BV is linked with sexual activity, but it's not a sexually transmitted infection (STI).   BV may or may not cause symptoms. If symptoms do occur, they can include:     Thin, gray, milky-white, or sometimes green discharge    Unpleasant odor or  fishy  smell    Itching, burning, or pain in or around the vagina  It is not known what causes BV, but certain factors can make the problem more likely. These can include:     Douching    Spermicides    Use of antibiotics    Change in hormone levels with pregnancy, breastfeeding, or menopause    Having sex with a new partner    Having sex with more than one partner  BV will sometimes go away on its own. But treatment is often advised. This is because untreated BV can raise the risk of more serious health problems such as:     Pelvic inflammatory disease (PID)     delivery (giving birth to a baby early if you re pregnant)    HIV and some other sexually transmitted infections (STIs)    Infection after surgery on the reproductive organs  Home care  General care    BV is most often treated with medicines called  antibiotics. These may be given as pills or as a vaginal cream. If antibiotics are prescribed, be sure to use them exactly as directed. And complete all of the medicine, even if your symptoms go away.    Don't douche or having sex during treatment.    If you have sex with a female partner, ask your healthcare provider if she should also be treated.  Prevention    Don't douche.    Don't have sex. If you do have sex, then take steps to lower your risk:  ? Use condoms when having sex.  ? Limit the number of sex partners you have.    Follow-up care  Follow up with your healthcare provider, or as advised.   When to get medical advice  Call your healthcare provider right away if:     You have a fever of 100.4 F (38 C) or higher, or as directed by your provider.    Your symptoms get worse, or they don t go away within a few days of starting treatment.    You have new pain in the lower belly or pelvic region.    You have side effects that bother you or a reaction to the pills or cream you re prescribed.    You or any of your sex partners have new symptoms, such as a rash, joint pain, or sores.  [employer] last reviewed this educational content on 6/1/2020 2000-2021 [employer]. All rights reserved. This information is not intended as a substitute for professional medical care. Always follow your healthcare professional's instructions.               Shmuel Lobato PA-C  [facility]    Subjective   21 year old who presents to clinic today for the following health issues:    Urgent Care and Vaginal Problem       HPI     Vaginal Symptoms  Onset/Duration: 3 days  Description:  Vaginal Discharge: white   Itching (Pruritis): YES  Burning sensation:  YES  Odor: no  Accompanying Signs & Symptoms:  Urinary symptoms: YES  Abdominal pain: no  Fever: no  History:   Possibility of Pregnancy:  no  Recent antibiotic use: no  Previous vaginitis issues: YES (BV one month ago)  Precipitating or alleviating  factors: None  Therapies tried and outcome: none    Metronidazole typically makes the patient nauseated.     Review of Systems   Review of Systems   See HPI     Objective    Temp: 98.3  F (36.8  C) Temp src: Oral BP: 100/62 Pulse: 70   Resp: 14         Physical Exam   Physical Exam  Constitutional:       General: She is not in acute distress.     Appearance: Normal appearance. She is normal weight. She is not ill-appearing, toxic-appearing or diaphoretic.   HENT:      Head: Normocephalic and atraumatic.   Cardiovascular:      Rate and Rhythm: Normal rate and regular rhythm.      Pulses: Normal pulses.   Pulmonary:      Effort: Pulmonary effort is normal. No respiratory distress.   Abdominal:      Tenderness: There is no right CVA tenderness or left CVA tenderness.   Neurological:      General: No focal deficit present.      Mental Status: She is alert and oriented to person, place, and time. Mental status is at baseline.      Gait: Gait normal.   Psychiatric:         Mood and Affect: Mood normal.         Behavior: Behavior normal.         Thought Content: Thought content normal.         Judgment: Judgment normal.          Results for orders placed or performed in visit on 10/11/21 (from the past 24 hour(s))   UA macro with reflex to Microscopic and Culture - Clinc Collect    Specimen: Urine, Midstream   Result Value Ref Range    Color Urine Yellow Colorless, Straw, Light Yellow, Yellow    Appearance Urine Clear Clear    Glucose Urine Negative Negative mg/dL    Bilirubin Urine Negative Negative    Ketones Urine Negative Negative mg/dL    Specific Gravity Urine 1.010 1.003 - 1.035    Blood Urine Negative Negative    pH Urine 6.0 5.0 - 7.0    Protein Albumin Urine Negative Negative mg/dL    Urobilinogen Urine 0.2 0.2, 1.0 E.U./dL    Nitrite Urine Negative Negative    Leukocyte Esterase Urine Small (A) Negative   Wet prep - Clinic Collect    Specimen: Vagina; Swab   Result Value Ref Range    Trichomonas Absent Absent     Yeast Absent Absent    Clue Cells Present (A) Absent    WBCs/high power field 2+ (A) None   Urine Microscopic   Result Value Ref Range    Bacteria Urine None Seen None Seen /HPF    RBC Urine 0-2 0-2 /HPF /HPF    WBC Urine 0-5 0-5 /HPF /HPF    Squamous Epithelials Urine Few (A) None Seen /LPF    Narrative    Urine Culture not indicated

## 2021-10-13 ENCOUNTER — NURSE TRIAGE (OUTPATIENT)
Dept: NURSING | Facility: CLINIC | Age: 21
End: 2021-10-13

## 2021-10-13 NOTE — TELEPHONE ENCOUNTER
Seen in  [facility] Monday 10-11-21 by Shmuel Lobato PA-C.  She tried to  prescribed Cleocin vaginal suppository at [facility] in [place].  It only comes in brand name and over $100 with insurance.  Wants an alternate medication.    Can a provider in [facility] help out with this today?    Lalitha Mo RN  [facility]

## 2021-10-14 DIAGNOSIS — N76.0 BACTERIAL VAGINOSIS: Primary | ICD-10-CM

## 2021-10-14 DIAGNOSIS — B96.89 BACTERIAL VAGINOSIS: Primary | ICD-10-CM

## 2021-10-14 RX ORDER — METRONIDAZOLE 7.5 MG/G
1 GEL VAGINAL DAILY
Qty: 35 G | Refills: 0 | Status: SHIPPED | OUTPATIENT
Start: 2021-10-14 | End: 2021-10-21

## 2021-11-13 ENCOUNTER — HEALTH MAINTENANCE LETTER (OUTPATIENT)
Age: 21
End: 2021-11-13

## 2021-12-13 ENCOUNTER — OFFICE VISIT (OUTPATIENT)
Dept: OBGYN | Facility: CLINIC | Age: 21
End: 2021-12-13
Payer: COMMERCIAL

## 2021-12-13 VITALS
WEIGHT: 161 LBS | SYSTOLIC BLOOD PRESSURE: 98 MMHG | HEIGHT: 63 IN | BODY MASS INDEX: 28.53 KG/M2 | DIASTOLIC BLOOD PRESSURE: 68 MMHG

## 2021-12-13 DIAGNOSIS — Z30.09 GENERAL COUNSELING FOR PRESCRIPTION OF ORAL CONTRACEPTIVES: Primary | ICD-10-CM

## 2021-12-13 DIAGNOSIS — N89.8 VAGINAL DISCHARGE: ICD-10-CM

## 2021-12-13 PROCEDURE — 87210 SMEAR WET MOUNT SALINE/INK: CPT | Performed by: NURSE PRACTITIONER

## 2021-12-13 PROCEDURE — 99203 OFFICE O/P NEW LOW 30 MIN: CPT | Performed by: NURSE PRACTITIONER

## 2021-12-13 RX ORDER — DROSPIRENONE AND ETHINYL ESTRADIOL 0.02-3(28)
1 KIT ORAL DAILY
Qty: 84 TABLET | Refills: 3 | Status: SHIPPED | OUTPATIENT
Start: 2021-12-13

## 2021-12-13 RX ORDER — CLINDAMYCIN PHOSPHATE 20 MG/G
1 CREAM VAGINAL AT BEDTIME
Qty: 40 G | Refills: 0 | Status: SHIPPED | OUTPATIENT
Start: 2021-12-13 | End: 2021-12-20

## 2021-12-13 ASSESSMENT — ANXIETY QUESTIONNAIRES
6. BECOMING EASILY ANNOYED OR IRRITABLE: NEARLY EVERY DAY
7. FEELING AFRAID AS IF SOMETHING AWFUL MIGHT HAPPEN: SEVERAL DAYS
1. FEELING NERVOUS, ANXIOUS, OR ON EDGE: MORE THAN HALF THE DAYS
5. BEING SO RESTLESS THAT IT IS HARD TO SIT STILL: MORE THAN HALF THE DAYS
IF YOU CHECKED OFF ANY PROBLEMS ON THIS QUESTIONNAIRE, HOW DIFFICULT HAVE THESE PROBLEMS MADE IT FOR YOU TO DO YOUR WORK, TAKE CARE OF THINGS AT HOME, OR GET ALONG WITH OTHER PEOPLE: VERY DIFFICULT
3. WORRYING TOO MUCH ABOUT DIFFERENT THINGS: MORE THAN HALF THE DAYS
2. NOT BEING ABLE TO STOP OR CONTROL WORRYING: MORE THAN HALF THE DAYS
GAD7 TOTAL SCORE: 14

## 2021-12-13 ASSESSMENT — MIFFLIN-ST. JEOR: SCORE: 1465.45

## 2021-12-13 ASSESSMENT — PATIENT HEALTH QUESTIONNAIRE - PHQ9
5. POOR APPETITE OR OVEREATING: MORE THAN HALF THE DAYS
SUM OF ALL RESPONSES TO PHQ QUESTIONS 1-9: 9

## 2021-12-13 NOTE — PROGRESS NOTES
Linda is a 21 year old No obstetric history on file. female who presents for annual exam.     Besides routine health maintenance, she would like to discuss birth control options and recurring BV.    HPI: patient is here for couple of things, She thinks she might have a BV infection, she states she is prone to them.  Also she has been on OCP's since she was 13 for extremely painful periods and heavy cycles.  She has been on a couple of different ones, last one Dassette 1/35 did not help the heavy bleeding and or cramps.  She was recently put on Carine and states has lightened cycle and advil will help cramps.  She states IC has always been painful for her.  ? Endometrosis, possible in family.  Never really been addressed per patient.  Patient also c/o low libido, but on cymbalta also and aware that or the OCP's can affect libido.    The patient's PCP is Dr. Patricia James MD.        GYNECOLOGIC HISTORY:    Patient's last menstrual period was 11/24/2021.    Regular menses? yes  Menses every 28 days.  Length of menses: 4 days    Her current contraception method is: oral contraceptives.  She  reports that she has never smoked. She has never used smokeless tobacco.    Patient is sexually active.  STD testing offered?  Declined     Last PHQ-9 score on record =   PHQ-9 SCORE 12/13/2021   PHQ-9 Total Score MyChart -   PHQ-9 Total Score 9   PHQ-A Total Score -     Last GAD7 score on record =   LÓPEZ-7 SCORE 12/13/2021   Total Score -   Total Score -   Total Score 14     Alcohol Score = 0    HEALTH MAINTENANCE:  Cholesterol: No results  Last Mammo: Not applicable, Next Mammo: Due at age 41.  Pap: N/A, due at age 22.  Colonoscopy: N/A, Next Colonoscopy: Due at age 46.  Dexa:  N/A    Health maintenance updated:  yes    HISTORY:  OB History   No obstetric history on file.       Patient Active Problem List   Diagnosis     Constipation     Allergic rhinitis     Headache     Generalized anxiety disorder     Menometrorrhagia      "Major depressive disorder, recurrent episode, moderate (HCC)     PMS (premenstrual syndrome)     Past Surgical History:   Procedure Laterality Date     NO HISTORY OF SURGERY        Social History     Tobacco Use     Smoking status: Never Smoker     Smokeless tobacco: Never Used     Tobacco comment: non smoking house    Substance Use Topics     Alcohol use: No      Problem (# of Occurrences) Relation (Name,Age of Onset)    Colon Cancer (1) Maternal Grandfather (Grandpa)    Colon Polyps (1) Maternal Grandfather (Grandpa)    Gallbladder Disease (1) Maternal Grandmother       Negative family history of: Genitourinary Problems            Current Outpatient Medications   Medication Sig     clindamycin (CLEOCIN) 2 % vaginal cream Place 1 applicator vaginally At Bedtime for 7 days     drospirenone-ethinyl estradiol (CONNOR) 3-0.02 MG tablet Take 1 tablet by mouth daily     DULoxetine (CYMBALTA) 60 MG capsule Take 1 capsule (60 mg) by mouth daily     No current facility-administered medications for this visit.     Allergies   Allergen Reactions     Pcn [Penicillins]      Body aches, nausea, stomach hurts       Past medical, surgical, social and family histories were reviewed and updated in EPIC.    ROS:   12 point review of systems negative other than symptoms noted below or in the HPI.  No urinary frequency or dysuria, bladder or kidney problems, Normal menstrual cycles    EXAM:  BP 98/68   Ht 1.594 m (5' 2.75\")   Wt 73 kg (161 lb)   LMP 11/24/2021   BMI 28.75 kg/m     BMI: Body mass index is 28.75 kg/m .    PHYSICAL EXAM:  Constitutional:   Appearance: Well nourished, well developed, alert, in no acute distress  Neck:  Lymph Nodes:  No lymphadenopathy present    Thyroid:  Gland size normal, nontender, no nodules or masses present  on palpation  Chest:  Respiratory Effort:  Breathing unlabored  Cardiovascular:    Heart: Auscultation:  Regular rate, normal rhythm, no murmurs " present  Breasts: Deferred.  Gastrointestinal:   Abdominal Examination:  Abdomen nontender to palpation, tone normal without rigidity or guarding, no masses present, umbilicus without lesions   Liver and Spleen:  No hepatomegaly present, liver nontender to palpation    Hernias:  No hernias present  Lymphatic: Lymph Nodes:  No other lymphadenopathy present  Skin:  General Inspection:  No rashes present, no lesions present, no areas of  discoloration  Neurologic:    Mental Status:  Oriented X3.  Normal strength and tone, sensory exam                grossly normal, mentation intact and speech normal.    Psychiatric:   Mentation appears normal and affect normal/bright.         Pelvic Exam:  External Genitalia:     Normal appearance for age, no discharge present, no tenderness present, no inflammatory lesions present, color normal  Vagina:     Normal vaginal vault without central or paravaginal defects, small mount of white discharge present, no inflammatory lesions present, no masses present  Patient declined speculum Just used swabs for discharge sampling.   Wet mount was done.  Bladder:     Nontender to palpation  Urethra:   Urethral Body:  Urethra palpation normal, urethra structural support normal   Urethral Meatus:  No erythema or lesions present  Perineum:     Perineum within normal limits, no evidence of trauma, no rashes or skin lesions present  Anus:     Anus within normal limits, no hemorrhoids present  Inguinal Lymph Nodes:     No lymphadenopathy present  Pubic Hair:     Normal pubic hair distribution for age  Genitalia and Groin:     No rashes present, no lesions present, no areas of discoloration, no masses present  Wet mount positive for clue cells.    COUNSELING:   Reviewed preventive health counseling, as reflected in patient instructions       Regular exercise       Healthy diet/nutrition       Contraception       Safe sex practices/STD prevention    BMI: Body mass index is 28.75  kg/m .      ASSESSMENT:  21 year old female with satisfactory annual exam.    ICD-10-CM    1. Encounter for gynecological examination without abnormal finding  Z01.419    2. General counseling for prescription of oral contraceptives  Z30.09 drospirenone-ethinyl estradiol (CONNOR) 3-0.02 MG tablet   3. Vaginal discharge  N89.8 Wet preparation     clindamycin (CLEOCIN) 2 % vaginal cream       PLAN:  Discussed medication options for BV, patient states oral flagyl makes her very nauseated and the metrogel doesn't seem to work that well.  Will try clindamycin cream for 7 nights.  Recommended for PAVEL after medication.  Also discussed IUD option for birth control, but decided we are going to stick with Connor for a few more months, since only started 2 months ago and see how goes.  Also discussed referring patient for a consult for endometrosis.    ELADIO Hathaway CNP

## 2021-12-14 ASSESSMENT — ANXIETY QUESTIONNAIRES: GAD7 TOTAL SCORE: 14

## 2022-01-08 ENCOUNTER — HEALTH MAINTENANCE LETTER (OUTPATIENT)
Age: 22
End: 2022-01-08

## 2022-02-22 ENCOUNTER — ANCILLARY PROCEDURE (OUTPATIENT)
Dept: GENERAL RADIOLOGY | Facility: CLINIC | Age: 22
End: 2022-02-22
Attending: FAMILY MEDICINE
Payer: COMMERCIAL

## 2022-02-22 ENCOUNTER — OFFICE VISIT (OUTPATIENT)
Dept: URGENT CARE | Facility: URGENT CARE | Age: 22
End: 2022-02-22
Payer: COMMERCIAL

## 2022-02-22 VITALS
DIASTOLIC BLOOD PRESSURE: 71 MMHG | TEMPERATURE: 98.8 F | OXYGEN SATURATION: 100 % | HEART RATE: 92 BPM | SYSTOLIC BLOOD PRESSURE: 115 MMHG

## 2022-02-22 DIAGNOSIS — M62.830 BACK MUSCLE SPASM: ICD-10-CM

## 2022-02-22 DIAGNOSIS — M54.50 BILATERAL LOW BACK PAIN WITHOUT SCIATICA, UNSPECIFIED CHRONICITY: Primary | ICD-10-CM

## 2022-02-22 PROCEDURE — 72100 X-RAY EXAM L-S SPINE 2/3 VWS: CPT | Performed by: RADIOLOGY

## 2022-02-22 PROCEDURE — 99214 OFFICE O/P EST MOD 30 MIN: CPT | Performed by: FAMILY MEDICINE

## 2022-02-22 RX ORDER — CYCLOBENZAPRINE HCL 10 MG
10 TABLET ORAL 2 TIMES DAILY PRN
Qty: 20 TABLET | Refills: 0 | Status: SHIPPED | OUTPATIENT
Start: 2022-02-22

## 2022-02-22 NOTE — LETTER
[facility]  [address] [place] [address]  Phone: [phone]    02/22/22    Linda Mckinnon  [address]    [facility] [id number]      To whom it may concern:     Linda Mckinnon seen in the urgent care for current condition.  At her job she needs to limit her weight lifting less than 10 pounds for next 2 weeks.      Sincerely,      Deidra Bailon MD

## 2022-02-22 NOTE — PROGRESS NOTES
Chief Complaint   Patient presents with     Urgent Care     Pt in clinic c/o LBP following injury two months ago.     Back Pain     Initial differential diagnosis included but was not restricted to   Differential Diagnosis:  MS Injury Pain: sprain, tendonitis, muscle strain and contusion  Medical Decision Making:    ASSESMENT AND PLAN     Linda was seen today for urgent care and back pain.    Diagnoses and all orders for this visit:    Bilateral low back pain without sciatica, unspecified chronicity  -     XR Lumbar Spine 2/3 Views  -     cyclobenzaprine (FLEXERIL) 10 MG tablet; Take 1 tablet (10 mg) by mouth 2 times daily as needed for muscle spasms  -     Physical Therapy Referral; Future    Back muscle spasm  -     Physical Therapy Referral; Future        Discussed with patient to consider doing physical therapy referral to physical therapy was done for patient  Tylenol, Ibuprofen, Fluids and Rest  Routine discharge counseling was given to the patient and the patient understands that worsening, changing or persistent symptoms should prompt an immediate call or follow up with their primary physician or the emergency department. The importance of appropriate follow up was also discussed with the patient.     I have reviewed the nursing notes.  Review of the result(s) of each unique test -   X-Ray was done, my findings are: no fracture noted     Time  spent on the date of the encounter doing chart review, review of test results, interpretation of tests, patient visit and documentation   see orders in Epic  Pt verbalized and agreed with the plan and is aware of the worsening symptoms for which would need to follow up .  Pt was stable during time of discharge from the clinic     SUBJECTIVE     Linda Mckinnon is a 21 year old female presenting with a chief complaint of    Chief Complaint   Patient presents with     Urgent Care     Pt in clinic c/o LBP following injury two months ago.     Back Pain     Back Pain    Onset  of symptoms was 2 month(s) ago.  Location: bilateral low back  Radiation: does not radiate  Context:       The injury happened while while walking      Mechanism: fall/near fall on ice      Patient experienced immediate pain, delayed pain  Course of symptoms is worsening.    Severity moderate  Current and Associated symptoms: pain and stiffness  Denies: fecal incontinence, urinary incontinence, lower extremity numbness, lower extremity weakness and paresthesia    Aggravating Factors: bending and changing position  Therapies to improve symptoms include: ibuprofen  Past history: no prior back problems          Past Medical History:   Diagnosis Date     Abdominal pain 8/7/2011    Lab work up negative-better with enema and miralax likely dx constipation      Allergic rhinitis      Depressive disorder      Flank pain 3/20/2014     Generalized anxiety disorder      Major depression      Pain in joint, shoulder region 2/25/2015     Pale 12/26/2013     Vesicoureteral reflux with reflux nephropathy, bilateral     resolved 8/04- normal RNC     Current Outpatient Medications   Medication Sig Dispense Refill     cyclobenzaprine (FLEXERIL) 10 MG tablet Take 1 tablet (10 mg) by mouth 2 times daily as needed for muscle spasms 20 tablet 0     DULoxetine (CYMBALTA) 60 MG capsule Take 1 capsule (60 mg) by mouth daily 90 capsule 1     drospirenone-ethinyl estradiol (CONNOR) 3-0.02 MG tablet Take 1 tablet by mouth daily (Patient not taking: Reported on 2/22/2022) 84 tablet 3     Social History     Tobacco Use     Smoking status: Never Smoker     Smokeless tobacco: Never Used     Tobacco comment: non smoking house    Substance Use Topics     Alcohol use: No     Family History   Problem Relation Age of Onset     Gallbladder Disease Maternal Grandmother      Colon Cancer Maternal Grandfather      Colon Polyps Maternal Grandfather      Genitourinary Problems No family hx of          ROS:    10 point ROS of systems including Constitutional,  Eyes, Respiratory, Cardiovascular, Gastroenterology, Genitourinary, Integumentary, Muscularskeletal, Psychiatric ,neurological were all negative except for pertinent positives noted in my HPI         OBJECTIVE:    /71   Pulse 92   Temp 98.8  F (37.1  C) (Temporal)   LMP 02/10/2022   SpO2 100%   GENERAL APPEARANCE: healthy, alert and no distress  EYES: EOMI,  PERRL, conjunctiva clear  CV: regular rates and rhythm, normal S1 S2, no murmur noted  NEURO: Normal strength and tone, sensory exam grossly normal,  normal speech and mentation  Lower back - some tenderness noted in the lower back with some paraspinal tenderness   St leg test is positive   PSYCH: mentation appears normal  Physical Exam      (Note was completed, in part, with [employer] voice-recognition software. Documentation reviewed, but some grammatical, spelling, and word errors may remain.)  Deidra Bailon MD.now.today

## 2022-08-02 ENCOUNTER — OFFICE VISIT (OUTPATIENT)
Dept: URGENT CARE | Facility: URGENT CARE | Age: 22
End: 2022-08-02
Payer: COMMERCIAL

## 2022-08-02 VITALS
DIASTOLIC BLOOD PRESSURE: 80 MMHG | TEMPERATURE: 98.1 F | SYSTOLIC BLOOD PRESSURE: 116 MMHG | OXYGEN SATURATION: 99 % | BODY MASS INDEX: 28.75 KG/M2 | WEIGHT: 161 LBS | HEART RATE: 95 BPM

## 2022-08-02 DIAGNOSIS — B37.31 YEAST INFECTION OF THE VAGINA: ICD-10-CM

## 2022-08-02 DIAGNOSIS — N76.0 BV (BACTERIAL VAGINOSIS): ICD-10-CM

## 2022-08-02 DIAGNOSIS — B96.89 BV (BACTERIAL VAGINOSIS): ICD-10-CM

## 2022-08-02 DIAGNOSIS — N89.8 VAGINAL DISCHARGE: Primary | ICD-10-CM

## 2022-08-02 DIAGNOSIS — Z11.3 SCREEN FOR STD (SEXUALLY TRANSMITTED DISEASE): ICD-10-CM

## 2022-08-02 LAB
CLUE CELLS: PRESENT
TRICHOMONAS, WET PREP: ABNORMAL
WBC'S/HIGH POWER FIELD, WET PREP: ABNORMAL
YEAST, WET PREP: PRESENT

## 2022-08-02 PROCEDURE — 99213 OFFICE O/P EST LOW 20 MIN: CPT | Performed by: FAMILY MEDICINE

## 2022-08-02 PROCEDURE — 87591 N.GONORRHOEAE DNA AMP PROB: CPT | Performed by: FAMILY MEDICINE

## 2022-08-02 PROCEDURE — 87491 CHLMYD TRACH DNA AMP PROBE: CPT | Performed by: FAMILY MEDICINE

## 2022-08-02 PROCEDURE — 87210 SMEAR WET MOUNT SALINE/INK: CPT | Performed by: FAMILY MEDICINE

## 2022-08-02 RX ORDER — METRONIDAZOLE 7.5 MG/G
1 GEL VAGINAL DAILY
Qty: 35 G | Refills: 0 | Status: SHIPPED | OUTPATIENT
Start: 2022-08-02 | End: 2022-08-09

## 2022-08-02 RX ORDER — FLUCONAZOLE 150 MG/1
150 TABLET ORAL ONCE
Qty: 1 TABLET | Refills: 0 | Status: SHIPPED | OUTPATIENT
Start: 2022-08-02 | End: 2022-08-02

## 2022-08-03 NOTE — PROGRESS NOTES
Chief Complaint   Patient presents with     Urgent Care     Vaginal Discharge     C/O vaginal discharger for 1 week     Linda was seen today for urgent care and vaginal discharge.    Diagnoses and all orders for this visit:    Vaginal discharge  -     Wet prep - Clinic Collect    Screen for STD (sexually transmitted disease)  -     NEISSERIA GONORRHOEA PCR  -     CHLAMYDIA TRACHOMATIS PCR    BV (bacterial vaginosis)  -     metroNIDAZOLE (METROGEL) 0.75 % vaginal gel; Place 1 applicator (5 g) vaginally daily for 7 days    Yeast infection of the vagina  -     fluconazole (DIFLUCAN) 150 MG tablet; Take 1 tablet (150 mg) by mouth once for 1 dose        Discussed with pt about the lab result  As no other systemic symptoms no further testing needed     D/d  Vaginitis , uti, std       SUBJECTIVE:   Linda Mckinnon is a 21 year old female who  presents today for a possible UTI. Symptoms of vaginal discharge have been going on for 5day(s).  Hematuria no.  sudden onsetand moderate.  There is no history of fever, chills, nausea or vomiting.  history of vaginal  discharge. This patient does not  have a history of urinary tract infections. Patient denies rigors, flank pain, temperature > 101 degrees F. and Vomiting, significant nausea or diarrhea     Past Medical History:   Diagnosis Date     Abdominal pain 8/7/2011    Lab work up negative-better with enema and miralax likely dx constipation      Allergic rhinitis      Depressive disorder      Flank pain 3/20/2014     Generalized anxiety disorder      Major depression      Pain in joint, shoulder region 2/25/2015     Pale 12/26/2013     Vesicoureteral reflux with reflux nephropathy, bilateral     resolved 8/04- normal RNC     Current Outpatient Medications   Medication Sig Dispense Refill     DULoxetine (CYMBALTA) 60 MG capsule Take 1 capsule (60 mg) by mouth daily 90 capsule 1     fluconazole (DIFLUCAN) 150 MG tablet Take 1 tablet (150 mg) by mouth once for 1 dose 1 tablet 0      metroNIDAZOLE (METROGEL) 0.75 % vaginal gel Place 1 applicator (5 g) vaginally daily for 7 days 35 g 0     cyclobenzaprine (FLEXERIL) 10 MG tablet Take 1 tablet (10 mg) by mouth 2 times daily as needed for muscle spasms (Patient not taking: Reported on 8/2/2022) 20 tablet 0     drospirenone-ethinyl estradiol (CONNOR) 3-0.02 MG tablet Take 1 tablet by mouth daily (Patient not taking: No sig reported) 84 tablet 3     Social History     Tobacco Use     Smoking status: Never Smoker     Smokeless tobacco: Never Used     Tobacco comment: non smoking house    Substance Use Topics     Alcohol use: No       ROS:   10 point ROS of systems including Constitutional, Eyes, Respiratory, Cardiovascular, Gastroenterology,Integumentary, Muscularskeletal, Psychiatric were all negative except for pertinent positives noted in my HPI     '    OBJECTIVE:  /80   Pulse 95   Temp 98.1  F (36.7  C) (Tympanic)   Wt 73 kg (161 lb)   LMP 07/29/2022   SpO2 99%   BMI 28.75 kg/m    GENERAL APPEARANCE: healthy, alert and no distress  RESP: lungs clear to auscultation - no rales, rhonchi or wheezes  CV: regular rates and rhythm, normal S1 S2, no murmur noted  ABDOMEN:  soft, nontender, no HSM or masses and bowel sounds normal  BACK: No CVA tenderness  PSYCH: mentation appears normal      Deidra Bailon MD

## 2022-08-04 ENCOUNTER — TELEPHONE (OUTPATIENT)
Dept: FAMILY MEDICINE | Facility: CLINIC | Age: 22
End: 2022-08-04

## 2022-08-04 LAB
C TRACH DNA SPEC QL NAA+PROBE: NEGATIVE
N GONORRHOEA DNA SPEC QL NAA+PROBE: NEGATIVE

## 2022-08-04 NOTE — TELEPHONE ENCOUNTER
Call received from patient inquiring about 8/2/22 results.    Writer reviewed lab results with patient and confirmed she took/started treatment based on wet prep results.    Patient verbalized understanding and in agreement with plan.    Patient encouraged to schedule a follow up visit if symptoms do not resolve after treatment.    MAHAMED SierraN, RN  [facility]

## 2022-08-29 ENCOUNTER — OFFICE VISIT (OUTPATIENT)
Dept: URGENT CARE | Facility: URGENT CARE | Age: 22
End: 2022-08-29
Payer: COMMERCIAL

## 2022-08-29 VITALS
RESPIRATION RATE: 12 BRPM | HEART RATE: 103 BPM | DIASTOLIC BLOOD PRESSURE: 76 MMHG | TEMPERATURE: 98.3 F | OXYGEN SATURATION: 100 % | SYSTOLIC BLOOD PRESSURE: 107 MMHG

## 2022-08-29 DIAGNOSIS — B96.89 BACTERIAL VAGINOSIS: Primary | ICD-10-CM

## 2022-08-29 DIAGNOSIS — N76.0 BACTERIAL VAGINOSIS: Primary | ICD-10-CM

## 2022-08-29 DIAGNOSIS — Z11.3 SCREEN FOR STD (SEXUALLY TRANSMITTED DISEASE): ICD-10-CM

## 2022-08-29 DIAGNOSIS — N89.8 VAGINAL DISCHARGE: ICD-10-CM

## 2022-08-29 PROCEDURE — 87491 CHLMYD TRACH DNA AMP PROBE: CPT | Performed by: PHYSICIAN ASSISTANT

## 2022-08-29 PROCEDURE — 87591 N.GONORRHOEAE DNA AMP PROB: CPT | Performed by: PHYSICIAN ASSISTANT

## 2022-08-29 PROCEDURE — 99213 OFFICE O/P EST LOW 20 MIN: CPT | Performed by: PHYSICIAN ASSISTANT

## 2022-08-29 PROCEDURE — 87210 SMEAR WET MOUNT SALINE/INK: CPT | Performed by: PHYSICIAN ASSISTANT

## 2022-08-29 RX ORDER — METRONIDAZOLE 7.5 MG/G
1 GEL VAGINAL DAILY
Qty: 35 G | Refills: 0 | Status: SHIPPED | OUTPATIENT
Start: 2022-08-29 | End: 2022-09-05

## 2022-08-29 NOTE — PROGRESS NOTES
Screen for STD (sexually transmitted disease)  - Chlamydia trachomatis/Neisseria gonorrhoeae by PCR - Clinic Collect  - Neisseria gonorrhoeae PCR    Vaginal discharge  - Wet prep - Clinic Collect  - metroNIDAZOLE (METROGEL) 0.75 % vaginal gel; Place 1 applicator (5 g) vaginally daily for 7 days    Bacterial vaginosis  - metroNIDAZOLE (METROGEL) 0.75 % vaginal gel; Place 1 applicator (5 g) vaginally daily for 7 days    DAYANA Guzman [facility] URGENT CARE    Subjective   22 year old who presents to clinic today for the following health issues:    Urgent Care and Vaginal Discharge       HPI     Vaginal Symptoms  Onset/Duration: X 3 days, no itchiness, no odor, yellow color discharge, last unprotected sexual intercourse 8/28, no abdominal pain, no urinary concerns  Description:  Vaginal Discharge: Yellow    Itching (Pruritis): No  Burning sensation:  No  Odor: No  Accompanying Signs & Symptoms:  Urinary symptoms: No  Abdominal pain: YES  Fever: No  History:   Sexually active: YES  New Partner: None  Possibility of Pregnancy:  No  Recent antibiotic use: No  Previous vaginitis issues: No  Precipitating or alleviating factors: None  Therapies tried and outcome: none    Review of Systems   Review of Systems   See HPI     Objective    Temp: 98.3  F (36.8  C) Temp src: Oral BP: 107/76 Pulse: 103   Resp: 12 SpO2: 100 %       Physical Exam   Physical Exam  Constitutional:       General: She is not in acute distress.     Appearance: Normal appearance. She is normal weight. She is not ill-appearing, toxic-appearing or diaphoretic.   HENT:      Head: Normocephalic and atraumatic.   Cardiovascular:      Rate and Rhythm: Normal rate.      Pulses: Normal pulses.   Pulmonary:      Effort: Pulmonary effort is normal. No respiratory distress.   Neurological:      Mental Status: She is alert.   Psychiatric:         Mood and Affect: Mood normal.         Behavior: Behavior normal.         Thought Content: Thought content  normal.         Judgment: Judgment normal.          Results for orders placed or performed in visit on 08/29/22 (from the past 24 hour(s))   Wet prep - Clinic Collect    Specimen: Vagina; Swab   Result Value Ref Range    Trichomonas Absent Absent    Yeast Absent Absent    Clue Cells Present (A) Absent    WBCs/high power field 4+ (A) None

## 2022-08-30 LAB
C TRACH DNA SPEC QL PROBE+SIG AMP: NEGATIVE
N GONORRHOEA DNA SPEC QL NAA+PROBE: NEGATIVE
N GONORRHOEA DNA SPEC QL NAA+PROBE: NEGATIVE

## 2022-10-07 ENCOUNTER — OFFICE VISIT (OUTPATIENT)
Dept: URGENT CARE | Facility: URGENT CARE | Age: 22
End: 2022-10-07
Payer: COMMERCIAL

## 2022-10-07 VITALS
TEMPERATURE: 98.5 F | DIASTOLIC BLOOD PRESSURE: 74 MMHG | WEIGHT: 175 LBS | BODY MASS INDEX: 32.2 KG/M2 | HEIGHT: 62 IN | HEART RATE: 107 BPM | SYSTOLIC BLOOD PRESSURE: 112 MMHG | OXYGEN SATURATION: 99 %

## 2022-10-07 DIAGNOSIS — R30.0 DYSURIA: ICD-10-CM

## 2022-10-07 DIAGNOSIS — J02.9 VIRAL PHARYNGITIS: Primary | ICD-10-CM

## 2022-10-07 DIAGNOSIS — J02.9 SORE THROAT: ICD-10-CM

## 2022-10-07 LAB
ALBUMIN UR-MCNC: NEGATIVE MG/DL
APPEARANCE UR: ABNORMAL
BACTERIA #/AREA URNS HPF: ABNORMAL /HPF
BILIRUB UR QL STRIP: NEGATIVE
COLOR UR AUTO: ABNORMAL
DEPRECATED S PYO AG THROAT QL EIA: NEGATIVE
GLUCOSE UR STRIP-MCNC: NEGATIVE MG/DL
GROUP A STREP BY PCR: NOT DETECTED
HGB UR QL STRIP: NEGATIVE
KETONES UR STRIP-MCNC: NEGATIVE MG/DL
LEUKOCYTE ESTERASE UR QL STRIP: ABNORMAL
MUCOUS THREADS #/AREA URNS LPF: PRESENT /LPF
NITRATE UR QL: NEGATIVE
PH UR STRIP: 6 [PH] (ref 5–7)
RBC #/AREA URNS AUTO: ABNORMAL /HPF
SP GR UR STRIP: 1.02 (ref 1–1.03)
SQUAMOUS #/AREA URNS AUTO: ABNORMAL /LPF
UROBILINOGEN UR STRIP-ACNC: 0.2 E.U./DL
WBC #/AREA URNS AUTO: ABNORMAL /HPF

## 2022-10-07 PROCEDURE — 87086 URINE CULTURE/COLONY COUNT: CPT | Performed by: PHYSICIAN ASSISTANT

## 2022-10-07 PROCEDURE — 81001 URINALYSIS AUTO W/SCOPE: CPT | Performed by: PHYSICIAN ASSISTANT

## 2022-10-07 PROCEDURE — 99213 OFFICE O/P EST LOW 20 MIN: CPT | Performed by: PHYSICIAN ASSISTANT

## 2022-10-07 PROCEDURE — 87651 STREP A DNA AMP PROBE: CPT | Performed by: PHYSICIAN ASSISTANT

## 2022-10-07 RX ORDER — DOXYCYCLINE 100 MG/1
100 CAPSULE ORAL
COMMUNITY
Start: 2022-10-04

## 2022-10-07 RX ORDER — METRONIDAZOLE 500 MG/1
500 TABLET ORAL
COMMUNITY
Start: 2022-10-04

## 2022-10-07 ASSESSMENT — ENCOUNTER SYMPTOMS
WEAKNESS: 0
SORE THROAT: 1
CHILLS: 0
ARTHRALGIAS: 0
LIGHT-HEADEDNESS: 0
DIARRHEA: 0
BACK PAIN: 0
NECK PAIN: 0
COUGH: 0
ALLERGIC/IMMUNOLOGIC NEGATIVE: 1
HEADACHES: 0
PALPITATIONS: 0
CARDIOVASCULAR NEGATIVE: 1
DIZZINESS: 0
RHINORRHEA: 0
RESPIRATORY NEGATIVE: 1
JOINT SWELLING: 0
EYES NEGATIVE: 1
ENDOCRINE NEGATIVE: 1
WOUND: 0
MUSCULOSKELETAL NEGATIVE: 1
FEVER: 1
NAUSEA: 0
MYALGIAS: 0
NECK STIFFNESS: 0
VOMITING: 0
SHORTNESS OF BREATH: 0

## 2022-10-07 NOTE — PROGRESS NOTES
"Chief Complaint:     Chief Complaint   Patient presents with     Pharyngitis     Pt has sore throat since yesterday,migraine since Tuesday, fever this morning   Pt went to er Tuesday and was treated for bv and pelvic inflammatory       Results for orders placed or performed in visit on 10/07/22   UA macro with reflex to Microscopic and Culture - Clinc Collect     Status: Abnormal    Specimen: Urine, Midstream   Result Value Ref Range    Color Urine Brown (A) Colorless, Straw, Light Yellow, Yellow    Appearance Urine Cloudy (A) Clear    Glucose Urine Negative Negative mg/dL    Bilirubin Urine Negative Negative    Ketones Urine Negative Negative mg/dL    Specific Gravity Urine 1.020 1.003 - 1.035    Blood Urine Negative Negative    pH Urine 6.0 5.0 - 7.0    Protein Albumin Urine Negative Negative mg/dL    Urobilinogen Urine 0.2 0.2, 1.0 E.U./dL    Nitrite Urine Negative Negative    Leukocyte Esterase Urine Small (A) Negative   Urine Microscopic     Status: Abnormal   Result Value Ref Range    Bacteria Urine Few (A) None Seen /HPF    RBC Urine None Seen 0-2 /HPF /HPF    WBC Urine 10-25 (A) 0-5 /HPF /HPF    Squamous Epithelials Urine Many (A) None Seen /LPF    Mucus Urine Present (A) None Seen /LPF   Streptococcus A Rapid Screen w/Reflex to PCR - Clinic Collect     Status: Normal    Specimen: Throat; Swab   Result Value Ref Range    Group A Strep antigen Negative Negative       Medical Decision Making:    Vital signs reviewed by Easton Robertson PA-C  /74 (BP Location: Left arm, Patient Position: Sitting, Cuff Size: Adult Regular)   Pulse 107   Temp 98.5  F (36.9  C) (Oral)   Ht 1.575 m (5' 2\")   Wt 79.4 kg (175 lb)   LMP 08/17/2022   SpO2 99%   BMI 32.01 kg/m      Differential Diagnosis:  URI Adult/Peds:  Sinusitis, Strep pharyngitis, Tonsilitis, Viral pharyngitis, Viral syndrome and Viral upper respiratory illness        ASSESSMENT    1. Viral pharyngitis    2. Sore throat    3. Dysuria  "       PLAN    Patient is in no acute distress.    Temp is 98.5 in clinic today, lung sounds were clear, and O2 sats at 99% on RA.    RST was negative.  We will call with PCR results only if positive.  Urine discussed with patient.  Patient is currently on Doxycycline.  Will wait for cultures before any new antibiotic treatment started.    Rest, Push fluids, vaporizer.  Ibuprofen and or Tylenol for any fever or body aches.  If symptoms worsen, recheck immediately otherwise follow up with your PCP in 1 week if symptoms are not improving.  Worrisome symptoms discussed with instructions to go to the ED.  Patient verbalized understanding and agreed with this plan.    Labs:    Results for orders placed or performed in visit on 10/07/22   UA macro with reflex to Microscopic and Culture - Clinc Collect     Status: Abnormal    Specimen: Urine, Midstream   Result Value Ref Range    Color Urine Brown (A) Colorless, Straw, Light Yellow, Yellow    Appearance Urine Cloudy (A) Clear    Glucose Urine Negative Negative mg/dL    Bilirubin Urine Negative Negative    Ketones Urine Negative Negative mg/dL    Specific Gravity Urine 1.020 1.003 - 1.035    Blood Urine Negative Negative    pH Urine 6.0 5.0 - 7.0    Protein Albumin Urine Negative Negative mg/dL    Urobilinogen Urine 0.2 0.2, 1.0 E.U./dL    Nitrite Urine Negative Negative    Leukocyte Esterase Urine Small (A) Negative   Urine Microscopic     Status: Abnormal   Result Value Ref Range    Bacteria Urine Few (A) None Seen /HPF    RBC Urine None Seen 0-2 /HPF /HPF    WBC Urine 10-25 (A) 0-5 /HPF /HPF    Squamous Epithelials Urine Many (A) None Seen /LPF    Mucus Urine Present (A) None Seen /LPF   Streptococcus A Rapid Screen w/Reflex to PCR - Clinic Collect     Status: Normal    Specimen: Throat; Swab   Result Value Ref Range    Group A Strep antigen Negative Negative        Vital signs reviewed by Easton Robertson PA-C  /74 (BP Location: Left arm, Patient Position: Sitting,  "Cuff Size: Adult Regular)   Pulse 107   Temp 98.5  F (36.9  C) (Oral)   Ht 1.575 m (5' 2\")   Wt 79.4 kg (175 lb)   LMP 08/17/2022   SpO2 99%   BMI 32.01 kg/m      Current Meds      Current Outpatient Medications:      doxycycline hyclate (VIBRAMYCIN) 100 MG capsule, Take 100 mg by mouth, Disp: , Rfl:      DULoxetine (CYMBALTA) 60 MG capsule, Take 1 capsule (60 mg) by mouth daily, Disp: 90 capsule, Rfl: 1     metroNIDAZOLE (FLAGYL) 500 MG tablet, Take 500 mg by mouth, Disp: , Rfl:      cyclobenzaprine (FLEXERIL) 10 MG tablet, Take 1 tablet (10 mg) by mouth 2 times daily as needed for muscle spasms (Patient not taking: No sig reported), Disp: 20 tablet, Rfl: 0     drospirenone-ethinyl estradiol (CONNOR) 3-0.02 MG tablet, Take 1 tablet by mouth daily (Patient not taking: No sig reported), Disp: 84 tablet, Rfl: 3      Respiratory History    occasional episodes of bronchitis      SUBJECTIVE    HPI: Linda Mckinnon is an 22 year old female who presents with fever and sore throat.  Symptoms began 2  days ago and has unchanged.  There is no shortness of breath, wheezing and chest pain.  Patient is eating and drinking well.  No fever, nausea, vomiting, or diarrhea.    She is also complaining of dysuria for the past several days.  No flank pain, or abdominal pain.    Patient denies any recent travel or exposure to known COVID positive tested individual.      ROS:     Review of Systems   Constitutional: Positive for fever. Negative for chills.   HENT: Positive for sore throat. Negative for congestion, ear pain and rhinorrhea.    Eyes: Negative.    Respiratory: Negative.  Negative for cough and shortness of breath.    Cardiovascular: Negative.  Negative for chest pain and palpitations.   Gastrointestinal: Negative for diarrhea, nausea and vomiting.   Endocrine: Negative.    Genitourinary: Negative.    Musculoskeletal: Negative.  Negative for arthralgias, back pain, joint swelling, myalgias, neck pain and neck stiffness. "   Skin: Negative.  Negative for rash and wound.   Allergic/Immunologic: Negative.  Negative for immunocompromised state.   Neurological: Negative for dizziness, weakness, light-headedness and headaches.         Family History   Family History   Problem Relation Age of Onset     Gallbladder Disease Maternal Grandmother      Colon Cancer Maternal Grandfather      Colon Polyps Maternal Grandfather      Genitourinary Problems No family hx of         Problem history  Patient Active Problem List   Diagnosis     Constipation     Allergic rhinitis     Headache     Generalized anxiety disorder     Menometrorrhagia     Major depressive disorder, recurrent episode, moderate (HCC)     PMS (premenstrual syndrome)        Allergies  Allergies   Allergen Reactions     Pcn [Penicillins]      Body aches, nausea, stomach hurts        Social History  Social History     Socioeconomic History     Marital status: Single     Spouse name: Not on file     Number of children: Not on file     Years of education: Not on file     Highest education level: Not on file   Occupational History     Occupation: 5 th [employer]     Employer: CHILD   Tobacco Use     Smoking status: Never Smoker     Smokeless tobacco: Never Used     Tobacco comment: non smoking house    Substance and Sexual Activity     Alcohol use: No     Drug use: No     Sexual activity: Yes     Partners: Male     Birth control/protection: Condom, Pill   Other Topics Concern     Parent/sibling w/ CABG, MI or angioplasty before 65F 55M? No   Social History Narrative    Lives with Mom, Dad and younger brother.  Recently started 7th grade, participates in volleyball.     Social Determinants of Health     Financial Resource Strain: Not on file   Food Insecurity: Not on file   Transportation Needs: Not on file   Physical Activity: Not on file   Stress: Not on file   Social Connections: Not on file   Intimate Partner Violence: Not on file   Housing Stability: Not on file       "  OBJECTIVE     Vital signs reviewed by Easton Robertson PA-C  /74 (BP Location: Left arm, Patient Position: Sitting, Cuff Size: Adult Regular)   Pulse 107   Temp 98.5  F (36.9  C) (Oral)   Ht 1.575 m (5' 2\")   Wt 79.4 kg (175 lb)   LMP 08/17/2022   SpO2 99%   BMI 32.01 kg/m       Physical Exam  Vitals and nursing note reviewed.   Constitutional:       General: She is not in acute distress.     Appearance: She is well-developed. She is not ill-appearing, toxic-appearing or diaphoretic.   HENT:      Head: Normocephalic and atraumatic.      Right Ear: Hearing, tympanic membrane, ear canal and external ear normal. Tympanic membrane is not perforated, erythematous, retracted or bulging.      Left Ear: Hearing, tympanic membrane, ear canal and external ear normal. Tympanic membrane is not perforated, erythematous, retracted or bulging.      Nose: Congestion present. No mucosal edema or rhinorrhea.      Right Sinus: No maxillary sinus tenderness or frontal sinus tenderness.      Left Sinus: No maxillary sinus tenderness or frontal sinus tenderness.      Mouth/Throat:      Pharynx: Posterior oropharyngeal erythema present. No pharyngeal swelling, oropharyngeal exudate or uvula swelling.      Tonsils: No tonsillar exudate or tonsillar abscesses. 0 on the right. 0 on the left.   Eyes:      General:         Right eye: No discharge.         Left eye: No discharge.      Pupils: Pupils are equal, round, and reactive to light.   Cardiovascular:      Rate and Rhythm: Normal rate and regular rhythm.      Heart sounds: Normal heart sounds. No murmur heard.    No friction rub. No gallop.   Pulmonary:      Effort: Pulmonary effort is normal. No respiratory distress.      Breath sounds: Normal breath sounds. No decreased breath sounds, wheezing, rhonchi or rales.   Chest:      Chest wall: No tenderness.   Abdominal:      General: Bowel sounds are normal. There is no distension.      Palpations: Abdomen is soft. There is no " mass.      Tenderness: There is no abdominal tenderness. There is no guarding.   Musculoskeletal:      Cervical back: Normal range of motion and neck supple.   Lymphadenopathy:      Head:      Right side of head: No submental, submandibular, tonsillar, preauricular or posterior auricular adenopathy.      Left side of head: No submental, submandibular, tonsillar, preauricular or posterior auricular adenopathy.      Cervical: No cervical adenopathy.      Right cervical: No superficial or posterior cervical adenopathy.     Left cervical: No superficial or posterior cervical adenopathy.   Skin:     General: Skin is warm and dry.      Findings: No rash.   Neurological:      Mental Status: She is alert and oriented to person, place, and time.      Cranial Nerves: No cranial nerve deficit.      Deep Tendon Reflexes: Reflexes are normal and symmetric.   Psychiatric:         Behavior: Behavior normal. Behavior is cooperative.         Thought Content: Thought content normal.         Judgment: Judgment normal.           Easton Robertson PA-C  10/7/2022, 11:12 AM

## 2022-10-09 ENCOUNTER — HOSPITAL ENCOUNTER (EMERGENCY)
Facility: CLINIC | Age: 22
Discharge: HOME | End: 2022-10-09
Attending: EMERGENCY MEDICINE | Admitting: EMERGENCY MEDICINE
Payer: COMMERCIAL

## 2022-10-09 VITALS
OXYGEN SATURATION: 99 % | HEART RATE: 107 BPM | TEMPERATURE: 98.6 F | RESPIRATION RATE: 18 BRPM | SYSTOLIC BLOOD PRESSURE: 106 MMHG | DIASTOLIC BLOOD PRESSURE: 82 MMHG

## 2022-10-09 DIAGNOSIS — R23.8 VESICULAR RASH: ICD-10-CM

## 2022-10-09 LAB
BACTERIA UR CULT: NORMAL
GROUP A STREP BY PCR: NOT DETECTED

## 2022-10-09 PROCEDURE — 250N000013 HC RX MED GY IP 250 OP 250 PS 637: Performed by: EMERGENCY MEDICINE

## 2022-10-09 PROCEDURE — 87593 ORTHOPOXVIRUS AMP PRB EACH: CPT | Performed by: EMERGENCY MEDICINE

## 2022-10-09 PROCEDURE — 99284 EMERGENCY DEPT VISIT MOD MDM: CPT | Performed by: EMERGENCY MEDICINE

## 2022-10-09 PROCEDURE — 87651 STREP A DNA AMP PROBE: CPT | Performed by: EMERGENCY MEDICINE

## 2022-10-09 PROCEDURE — 87529 HSV DNA AMP PROBE: CPT | Mod: 59 | Performed by: EMERGENCY MEDICINE

## 2022-10-09 RX ORDER — IBUPROFEN 200 MG
400 TABLET ORAL 3 TIMES DAILY
Qty: 42 TABLET | Refills: 0 | Status: SHIPPED | OUTPATIENT
Start: 2022-10-09

## 2022-10-09 RX ORDER — HYDROXYZINE HYDROCHLORIDE 25 MG/1
25-50 TABLET, FILM COATED ORAL 3 TIMES DAILY PRN
Qty: 30 TABLET | Refills: 0 | Status: SHIPPED | OUTPATIENT
Start: 2022-10-09

## 2022-10-09 RX ORDER — OXYCODONE HYDROCHLORIDE 10 MG/1
10 TABLET ORAL ONCE
Status: COMPLETED | OUTPATIENT
Start: 2022-10-09 | End: 2022-10-09

## 2022-10-09 RX ORDER — ACETAMINOPHEN 500 MG
1000 TABLET ORAL ONCE
Status: COMPLETED | OUTPATIENT
Start: 2022-10-09 | End: 2022-10-09

## 2022-10-09 RX ORDER — VALACYCLOVIR HYDROCHLORIDE 1 G/1
1000 TABLET, FILM COATED ORAL 2 TIMES DAILY
Qty: 20 TABLET | Refills: 0 | Status: SHIPPED | OUTPATIENT
Start: 2022-10-09 | End: 2022-10-24

## 2022-10-09 RX ORDER — ACETAMINOPHEN 500 MG
1000 TABLET ORAL 3 TIMES DAILY
Qty: 42 TABLET | Refills: 0 | Status: SHIPPED | OUTPATIENT
Start: 2022-10-09 | End: 2022-10-16

## 2022-10-09 RX ORDER — OXYCODONE HYDROCHLORIDE 5 MG/1
5 TABLET ORAL EVERY 6 HOURS PRN
Qty: 6 TABLET | Refills: 0 | Status: SHIPPED | OUTPATIENT
Start: 2022-10-09 | End: 2022-10-12

## 2022-10-09 RX ADMIN — OXYCODONE HYDROCHLORIDE 10 MG: 10 TABLET ORAL at 16:07

## 2022-10-09 RX ADMIN — ACETAMINOPHEN 1000 MG: 500 TABLET ORAL at 16:07

## 2022-10-09 NOTE — ED TRIAGE NOTES
Patient ambulatory to triage for worsening vaginal bumps. Patient was seen a few days ago at a different hospital but since then have worsened in pain. Patient also complaining of a sore throat.      Triage Assessment     Row Name 10/09/22 1517       Triage Assessment (Adult)    Airway WDL WDL       Respiratory WDL    Respiratory WDL WDL       Skin Circulation/Temperature WDL    Skin Circulation/Temperature WDL WDL       Cardiac WDL    Cardiac WDL WDL       Peripheral/Neurovascular WDL    Peripheral Neurovascular WDL WDL       Cognitive/Neuro/Behavioral WDL    Cognitive/Neuro/Behavioral WDL WDL       Germaine Coma Scale    Best Eye Response 4-->(E4) spontaneous    Best Motor Response 6-->(M6) obeys commands    Best Verbal Response 5-->(V5) oriented    Germaine Coma Scale Score 15

## 2022-10-10 ENCOUNTER — OFFICE VISIT (OUTPATIENT)
Dept: OBGYN | Facility: CLINIC | Age: 22
End: 2022-10-10
Attending: MIDWIFE
Payer: COMMERCIAL

## 2022-10-10 VITALS
HEART RATE: 121 BPM | WEIGHT: 171 LBS | DIASTOLIC BLOOD PRESSURE: 75 MMHG | BODY MASS INDEX: 31.47 KG/M2 | HEIGHT: 62 IN | SYSTOLIC BLOOD PRESSURE: 107 MMHG

## 2022-10-10 DIAGNOSIS — B00.9 HSV-2 (HERPES SIMPLEX VIRUS 2) INFECTION: ICD-10-CM

## 2022-10-10 DIAGNOSIS — N94.6 DYSMENORRHEA: Primary | ICD-10-CM

## 2022-10-10 DIAGNOSIS — R11.2 NAUSEA AND VOMITING, UNSPECIFIED VOMITING TYPE: ICD-10-CM

## 2022-10-10 LAB
HSV1 DNA SPEC QL NAA+PROBE: NOT DETECTED
HSV2 DNA SPEC QL NAA+PROBE: DETECTED

## 2022-10-10 PROCEDURE — 99214 OFFICE O/P EST MOD 30 MIN: CPT | Performed by: MIDWIFE

## 2022-10-10 PROCEDURE — G0463 HOSPITAL OUTPT CLINIC VISIT: HCPCS

## 2022-10-10 RX ORDER — ACYCLOVIR 400 MG/1
400 TABLET ORAL EVERY 8 HOURS
Qty: 30 TABLET | Refills: 0 | Status: SHIPPED | OUTPATIENT
Start: 2022-10-10 | End: 2022-10-20

## 2022-10-10 RX ORDER — LEVONORGESTREL AND ETHINYL ESTRADIOL 0.15-0.03
1 KIT ORAL DAILY
Status: CANCELLED | OUTPATIENT
Start: 2022-10-10

## 2022-10-10 RX ORDER — ONDANSETRON 4 MG/1
4 TABLET, ORALLY DISINTEGRATING ORAL EVERY 8 HOURS PRN
Qty: 14 TABLET | Refills: 0 | Status: SHIPPED | OUTPATIENT
Start: 2022-10-10

## 2022-10-10 RX ORDER — ACYCLOVIR 400 MG/1
400 TABLET ORAL EVERY 8 HOURS
Qty: 15 TABLET | Refills: 8 | Status: SHIPPED | OUTPATIENT
Start: 2022-10-10 | End: 2022-10-15

## 2022-10-10 RX ORDER — METRONIDAZOLE 7.5 MG/G
GEL VAGINAL
COMMUNITY
Start: 2022-09-21

## 2022-10-10 RX ORDER — LEVONORGESTREL AND ETHINYL ESTRADIOL 100-20(84)
1 KIT ORAL DAILY
Qty: 91 TABLET | Refills: 4 | Status: SHIPPED | OUTPATIENT
Start: 2022-10-10 | End: 2023-04-20

## 2022-10-10 NOTE — PROGRESS NOTES
Chief Complaint:  Heavy periods and new lesions  Subjective:  Patient is a 22 year old who initially was scheduled for a WWE for her first pap and to discuss her history of heavy and painful monthly periods. Since scheduling that appointment she was seen in the ED twice in the past week for pelvic/genital pain and would like to discuss this instead. Declines pelvic exam today. Her mom has accompanied her here today.     Linda was seen in the ED on 10/7/22 for pelvic pain, body aches, and fever. She started treatment for PID and BV that day. Her CT/GC testing was subsequently negative. Then yesterday, 10/9/22, she was seen for vulvar lesions and tested for both HSV and monkey pox.     Pt was informed here that swab is positive for HSV2. Pt is teary but appears to have good support from her mom. Discussed symptoms, transmission, course, and management of HSV2.     LMP ~4 weeks ago. Next is due in tomorrow. Current Birth Control:  Condoms, but did not use with last SIC on 10/3/22. Linda has heavy menstrual periods, monthly.  They have been this way for may years and symptoms are described as severe and interruptive. She was evaluated in the past for this and was told that maybe she needs surgery for endometriosis, but feels that provider was dismissive and then did not follow through with referral process. Reviewed typical symptoms of endometriosis and diagnostic uncertainty.     No migraine, non smoker, no personal or family h/o DVT.     Current Outpatient Medications   Medication     acetaminophen (TYLENOL) 500 MG tablet     doxycycline hyclate (VIBRAMYCIN) 100 MG capsule     DULoxetine (CYMBALTA) 60 MG capsule     ibuprofen (ADVIL/MOTRIN) 200 MG tablet     metroNIDAZOLE (FLAGYL) 500 MG tablet     valACYclovir (VALTREX) 1000 mg tablet     cyclobenzaprine (FLEXERIL) 10 MG tablet     drospirenone-ethinyl estradiol (CONNOR) 3-0.02 MG tablet     hydrOXYzine (ATARAX) 25 MG tablet     metroNIDAZOLE (METROGEL) 0.75 % vaginal gel  "    oxyCODONE (ROXICODONE) 5 MG tablet     No current facility-administered medications for this visit.     Past Medical History:   Diagnosis Date     Abdominal pain 8/7/2011    Lab work up negative-better with enema and miralax likely dx constipation      Allergic rhinitis      Depressive disorder      Flank pain 3/20/2014     Generalized anxiety disorder      Major depression      Pain in joint, shoulder region 2/25/2015     Pale 12/26/2013     Vesicoureteral reflux with reflux nephropathy, bilateral     resolved 8/04- normal RNC       Objective:  Vitals:    10/10/22 1047   BP: 107/75   BP Location: Left arm   Patient Position: Sitting   Cuff Size: Adult Regular   Pulse: (!) 121   Weight: 77.6 kg (171 lb)   Height: 1.575 m (5' 2\")     Assessment/Plan:  (N94.6) Dysmenorrhea  (primary encounter diagnosis)  Plan: levonorgest-eth estrad 91-day (LOSEASONIQUE)         0.1-0.02 & 0.01 MG tablet          (R11.2) Nausea and vomiting, unspecified vomiting type  Comment: Has been having difficulty keeping antibiotics for PID/BV down. Vomiting each morning after taking. Pt is wondering if she can stop antibiotics since pelvic pain has resolved. Discussed that BV was diagnosed through lab test so treatment should be compeleted. Reviewed that aches and fever could be related to HSV, but recommendation is to complete PID treatment since we are not able to clearly distinguish. Will plan antiemetic in am with abx. Reviewed importance of abstaining from alcohol use while on metronidazole.   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   Plan: acyclovir (ZOVIRAX) 400 MG tablet, acyclovir         (ZOVIRAX) 400 MG tablet, benzocaine         (AMERICAINE) 20 % external aerosol          We discussed risks, benefits, side effects, and method of action of  " birth control options. Pt would like to start COCs for pregnancy prevention and to help with dysmenorrhea.   - Reviewed to start on day 1-3 menstrual cycle. If no period in next couple days take UPT.    - Encouraged condom use with all sexual acitivity for STI risk reduction. RTC in 1-2 months for well woman exam and repeat STI testing.   - Reviewed how/why to contact provider if abdominal pain, chest pain, headaches, eye problems, swelling, or aching in legs/thighs.  Call or send MyChart with questions.     32 minutes spent on the date of the encounter doing chart review, history and exam, documentation and further activities per the note.    ELADIO Charlton, TKM

## 2022-10-10 NOTE — LETTER
10/10/2022       RE: Linda Mckinnon  [address]  [id number]  [place] [address]     Dear Colleague,    Thank you for referring your patient, Linda Mckinnon, to the [facility] [place] at [facility]. Please see a copy of my visit note below.    Chief Complaint:  Heavy periods and new lesions  Subjective:  Patient is a 22 year old who initially was scheduled for a WWE for her first pap and to discuss her history of heavy and painful monthly periods. Since scheduling that appointment she was seen in the ED twice in the past week for pelvic/genital pain and would like to discuss this instead. Declines pelvic exam today. Her mom has accompanied her here today.     Linda was seen in the ED on 10/7/22 for pelvic pain, body aches, and fever. She started treatment for PID and BV that day. Her CT/GC testing was subsequently negative. Then yesterday, 10/9/22, she was seen for vulvar lesions and tested for both HSV and monkey pox.     Pt was informed here that swab is positive for HSV2. Pt is teary but appears to have good support from her mom. Discussed symptoms, transmission, course, and management of HSV2.     LMP ~4 weeks ago. Next is due in tomorrow. Current Birth Control:  Condoms, but did not use with last SIC on 10/3/22. Linda has heavy menstrual periods, monthly.  They have been this way for may years and symptoms are described as severe and interruptive. She was evaluated in the past for this and was told that maybe she needs surgery for endometriosis, but feels that provider was dismissive and then did not follow through with referral process. Reviewed typical symptoms of endometriosis and diagnostic uncertainty.     No migraine, non smoker, no personal or family h/o DVT.     Current Outpatient Medications   Medication     acetaminophen (TYLENOL) 500 MG tablet     doxycycline hyclate (VIBRAMYCIN) 100 MG capsule     DULoxetine (CYMBALTA) 60 MG  "capsule     ibuprofen (ADVIL/MOTRIN) 200 MG tablet     metroNIDAZOLE (FLAGYL) 500 MG tablet     valACYclovir (VALTREX) 1000 mg tablet     cyclobenzaprine (FLEXERIL) 10 MG tablet     drospirenone-ethinyl estradiol (CONNOR) 3-0.02 MG tablet     hydrOXYzine (ATARAX) 25 MG tablet     metroNIDAZOLE (METROGEL) 0.75 % vaginal gel     oxyCODONE (ROXICODONE) 5 MG tablet     No current facility-administered medications for this visit.     Past Medical History:   Diagnosis Date     Abdominal pain 8/7/2011    Lab work up negative-better with enema and miralax likely dx constipation      Allergic rhinitis      Depressive disorder      Flank pain 3/20/2014     Generalized anxiety disorder      Major depression      Pain in joint, shoulder region 2/25/2015     Pale 12/26/2013     Vesicoureteral reflux with reflux nephropathy, bilateral     resolved 8/04- normal RNC       Objective:  Vitals:    10/10/22 1047   BP: 107/75   BP Location: Left arm   Patient Position: Sitting   Cuff Size: Adult Regular   Pulse: (!) 121   Weight: 77.6 kg (171 lb)   Height: 1.575 m (5' 2\")     Assessment/Plan:  (N94.6) Dysmenorrhea  (primary encounter diagnosis)  Plan: levonorgest-eth estrad 91-day (LOSEASONIQUE)         0.1-0.02 & 0.01 MG tablet          (R11.2) Nausea and vomiting, unspecified vomiting type  Comment: Has been having difficulty keeping antibiotics for PID/BV down. Vomiting each morning after taking. Pt is wondering if she can stop antibiotics since pelvic pain has resolved. Discussed that BV was diagnosed through lab test so treatment should be compeleted. Reviewed that aches and fever could be related to HSV, but recommendation is to complete PID treatment since we are not able to clearly distinguish. Will plan antiemetic in am with abx. Reviewed importance of abstaining from alcohol use while on metronidazole.   Plan: ondansetron (ZOFRAN ODT) 4 MG ODT tab          (B00.9) HSV-2 (herpes simplex virus 2) infection  Comment: Reviewed " primary outbreak treatment, treatment of recurrent episodes and when suppressive therapy would be recommended.   Plan: acyclovir (ZOVIRAX) 400 MG tablet, acyclovir         (ZOVIRAX) 400 MG tablet, benzocaine         (AMERICAINE) 20 % external aerosol          We discussed risks, benefits, side effects, and method of action of  birth control options. Pt would like to start COCs for pregnancy prevention and to help with dysmenorrhea.   - Reviewed to start on day 1-3 menstrual cycle. If no period in next couple days take UPT.    - Encouraged condom use with all sexual acitivity for STI risk reduction. RTC in 1-2 months for well woman exam and repeat STI testing.   - Reviewed how/why to contact provider if abdominal pain, chest pain, headaches, eye problems, swelling, or aching in legs/thighs.  Call or send MyChart with questions.     32 minutes spent on the date of the encounter doing chart review, history and exam, documentation and further activities per the note.    ELADIO Charlton, EASTON

## 2022-10-10 NOTE — PATIENT INSTRUCTIONS

## 2022-10-13 LAB — ORTHOPOXVIRUS DNA SPEC QL NAA+PROBE: NOT DETECTED

## 2022-10-24 ENCOUNTER — TELEPHONE (OUTPATIENT)
Dept: OBGYN | Facility: CLINIC | Age: 22
End: 2022-10-24

## 2022-10-24 DIAGNOSIS — B00.9 HSV-2 (HERPES SIMPLEX VIRUS 2) INFECTION: Primary | ICD-10-CM

## 2022-10-24 RX ORDER — VALACYCLOVIR HYDROCHLORIDE 1 G/1
1000 TABLET, FILM COATED ORAL 2 TIMES DAILY
Qty: 20 TABLET | Refills: 0 | Status: SHIPPED | OUTPATIENT
Start: 2022-10-24 | End: 2022-11-11

## 2022-11-03 NOTE — ED PROVIDER NOTES
ED Provider Note  [facility]      History     Chief Complaint   Patient presents with     Vaginal Pain     Pharyngitis     HPI  Linda Mckinnon is a 22 year old female who reports a vesicular vaginal rash for the past 3 days along with sore throat.  She reports new sexual partner without barrier protection's.  She does have some generalized myalgias and arthralgias but denies any fevers or chills, no cough or shortness of breath.    Past Medical History  Past Medical History:   Diagnosis Date     Abdominal pain 8/7/2011    Lab work up negative-better with enema and miralax likely dx constipation      Allergic rhinitis      Depressive disorder      Flank pain 3/20/2014     Generalized anxiety disorder      Major depression      Pain in joint, shoulder region 2/25/2015     Pale 12/26/2013     Vesicoureteral reflux with reflux nephropathy, bilateral     resolved 8/04- normal RNC     Past Surgical History:   Procedure Laterality Date     NO HISTORY OF SURGERY       hydrOXYzine (ATARAX) 25 MG tablet  ibuprofen (ADVIL/MOTRIN) 200 MG tablet  acyclovir (ZOVIRAX) 400 MG tablet  acyclovir (ZOVIRAX) 400 MG tablet  benzocaine (AMERICAINE) 20 % external aerosol  cyclobenzaprine (FLEXERIL) 10 MG tablet  doxycycline hyclate (VIBRAMYCIN) 100 MG capsule  drospirenone-ethinyl estradiol (CONNOR) 3-0.02 MG tablet  DULoxetine (CYMBALTA) 60 MG capsule  levonorgest-eth estrad 91-day (LOSEASONIQUE) 0.1-0.02 & 0.01 MG tablet  metroNIDAZOLE (FLAGYL) 500 MG tablet  metroNIDAZOLE (METROGEL) 0.75 % vaginal gel  ondansetron (ZOFRAN ODT) 4 MG ODT tab  valACYclovir (VALTREX) 1000 mg tablet      Allergies   Allergen Reactions     Pcn [Penicillins]      Body aches, nausea, stomach hurts     Family History  Family History   Problem Relation Age of Onset     Gallbladder Disease Maternal Grandmother      Colon Cancer Maternal Grandfather      Colon Polyps Maternal Grandfather      Genitourinary Problems No family hx of       Social History   Social History     Tobacco Use     Smoking status: Never     Smokeless tobacco: Never     Tobacco comments:     non smoking house    Substance Use Topics     Alcohol use: No     Drug use: No      Past medical history, past surgical history, medications, allergies, family history, and social history were reviewed with the patient. No additional pertinent items.       Review of Systems  A complete review of systems was performed with pertinent positives and negatives noted in the HPI, and all other systems negative.    Physical Exam   BP: 135/86  Pulse: 119  Temp: 98.6  F (37  C)  Resp: 18  SpO2: 98 %  Physical Exam  Vitals and nursing note reviewed.   Constitutional:       General: She is not in acute distress.     Appearance: She is well-developed. She is not diaphoretic.   HENT:      Head: Atraumatic.      Mouth/Throat:      Pharynx: No oropharyngeal exudate.   Eyes:      General: No scleral icterus.     Pupils: Pupils are equal, round, and reactive to light.   Cardiovascular:      Heart sounds: Normal heart sounds.   Pulmonary:      Effort: No respiratory distress.      Breath sounds: Normal breath sounds.   Abdominal:      General: Bowel sounds are normal.      Palpations: Abdomen is soft.      Tenderness: There is no abdominal tenderness.   Genitourinary:     Labia:         Right: Rash present.       Comments: Vesicular rash in the perineum  Musculoskeletal:         General: No tenderness.   Skin:     General: Skin is warm.      Findings: No rash.   Neurological:      General: No focal deficit present.      Mental Status: She is alert and oriented to person, place, and time.           ED Course      Procedures                 Results for orders placed or performed during the hospital encounter of 10/09/22   Orthopoxvirus (includes monkeypox) by PCR     Status: None    Specimen: Perineum; Swab   Result Value Ref Range    Orthopoxvirus by PCR Not Detected    Herpes Simplex Virus 1&2 by PCR      Status: Abnormal    Specimen: Perineum; Swab   Result Value Ref Range    Herpes Simplex Virus 1 DNA Not Detected Not Detected    Herpes Simplex Virus 2 DNA Detected (A) Not Detected    Narrative    The [employer] HSV 1 & 2 Direct assay on the 1Lay instrument is a FDA-approved, real-time PCR test for the qualitative detection and differentiation of Herpes Simplex virus Type 1 & 2 DNA from patients with signs and symptoms of HSV-1 or 2 infection.   Group A Streptococcus PCR Throat Swab     Status: Normal    Specimen: Throat; Swab   Result Value Ref Range    Group A strep by PCR Not Detected Not Detected    Narrative    The Xpert Xpress Strep A test, performed on the [employer], is a rapid, qualitative in vitro diagnostic test for the detection of Streptococcus pyogenes (Group A ß-hemolytic Streptococcus, Strep A) in throat swab specimens from patients with signs and symptoms of pharyngitis. The Xpert Xpress Strep A test can be used as an aid in the diagnosis of Group A Streptococcal pharyngitis. The assay is not intended to monitor treatment for Group A Streptococcus infections. The Xpert Xpress Strep A test utilizes an automated real-time polymerase chain reaction (PCR) to detect Streptococcus pyogenes DNA.     Medications   acetaminophen (TYLENOL) tablet 1,000 mg (1,000 mg Oral Given 10/9/22 1607)   oxyCODONE IR (ROXICODONE) tablet 10 mg (10 mg Oral Given 10/9/22 1607)        Assessments & Plan (with Medical Decision Making)     22-year-old female with vesicular rash and sore throat.  Vital signs in triage notable for elevated heart rate 119 but otherwise afebrile and stable including normal pulse ox at 98% on room air.  Patient was given acetaminophen and oxycodone without relief of her pain.  Rapid strep negative here in the emergency department.  Monkeypox and HSV swabs obtained here in the emergency room.  Plan to follow-up with GYN the following day as planned and  patient will review my chart to obtain test results.  Patient given prescriptions for analgesics and Atarax for anxiety.    I have reviewed the nursing notes. I have reviewed the findings, diagnosis, plan and need for follow up with the patient.    Discharge Medication List as of 10/9/2022  4:54 PM      START taking these medications    Details   acetaminophen (TYLENOL) 500 MG tablet Take 2 tablets (1,000 mg) by mouth 3 times daily for 7 days, Disp-42 tablet, R-0, Local Print      hydrOXYzine (ATARAX) 25 MG tablet Take 1-2 tablets (25-50 mg) by mouth 3 times daily as needed for itching, Disp-30 tablet, R-0, Local Print      ibuprofen (ADVIL/MOTRIN) 200 MG tablet Take 2 tablets (400 mg) by mouth 3 times daily, Disp-42 tablet, R-0, Local Print      oxyCODONE (ROXICODONE) 5 MG tablet Take 1 tablet (5 mg) by mouth every 6 hours as needed for severe pain, Disp-6 tablet, R-0, Local Print      valACYclovir (VALTREX) 1000 mg tablet Take 1 tablet (1,000 mg) by mouth 2 times daily for 10 days, Disp-20 tablet, R-0, Local Print             Final diagnoses:   Vesicular rash       --  Cristela HERNADEZ [facility] EMERGENCY DEPARTMENT  10/9/2022     Cristela Parsons MD  11/03/22 [id number]

## 2022-11-20 ENCOUNTER — HEALTH MAINTENANCE LETTER (OUTPATIENT)
Age: 22
End: 2022-11-20

## 2022-11-30 ENCOUNTER — TELEPHONE (OUTPATIENT)
Dept: OBGYN | Facility: CLINIC | Age: 22
End: 2022-11-30

## 2022-11-30 NOTE — TELEPHONE ENCOUNTER
Patient calling requesting refill of valtrex. Let patient know that refill was sent on 11/28.     Patient also wondering if she can get on suppressive therapy as she has had 3 outbreaks in the last 2 months. Doesn't currently have primary but was diagnosed with this through [facility]. Let patient know I will route message to provider regarding suppressive therapy.

## 2022-12-01 DIAGNOSIS — B00.9 HSV-2 (HERPES SIMPLEX VIRUS 2) INFECTION: Primary | ICD-10-CM

## 2022-12-01 RX ORDER — VALACYCLOVIR HYDROCHLORIDE 500 MG/1
500 TABLET, FILM COATED ORAL DAILY
Qty: 90 TABLET | Refills: 3 | Status: SHIPPED | OUTPATIENT
Start: 2022-12-01 | End: 2023-04-20

## 2022-12-01 NOTE — TELEPHONE ENCOUNTER
"Called patient to pass on [facility] Message:    \"The suppression therapy has been ordered. Please let her know that we will need to discuss the continuation of ongoing suppressive antiviral therapy annually since the number of recurrences diminishes with time, whether or not antiviral therapy is administered. However, I've put in the order for 1 year's worth of suppressive therapy.\"    Patient verbalized understanding.  "

## 2023-01-04 ENCOUNTER — MYC REFILL (OUTPATIENT)
Dept: OBGYN | Facility: CLINIC | Age: 23
End: 2023-01-04

## 2023-01-04 ENCOUNTER — MYC REFILL (OUTPATIENT)
Dept: FAMILY MEDICINE | Facility: CLINIC | Age: 23
End: 2023-01-04

## 2023-01-04 DIAGNOSIS — B00.9 HSV-2 (HERPES SIMPLEX VIRUS 2) INFECTION: ICD-10-CM

## 2023-01-04 RX ORDER — VALACYCLOVIR HYDROCHLORIDE 1 G/1
1000 TABLET, FILM COATED ORAL 2 TIMES DAILY
Qty: 20 TABLET | Refills: 0 | Status: CANCELLED | OUTPATIENT
Start: 2023-01-04

## 2023-01-05 RX ORDER — METRONIDAZOLE 500 MG/1
500 TABLET ORAL
OUTPATIENT
Start: 2023-01-05

## 2023-04-15 ENCOUNTER — HEALTH MAINTENANCE LETTER (OUTPATIENT)
Age: 23
End: 2023-04-15

## 2023-04-20 DIAGNOSIS — N94.6 DYSMENORRHEA: ICD-10-CM

## 2023-04-20 DIAGNOSIS — B00.9 HSV-2 (HERPES SIMPLEX VIRUS 2) INFECTION: ICD-10-CM

## 2023-04-20 RX ORDER — VALACYCLOVIR HYDROCHLORIDE 500 MG/1
500 TABLET, FILM COATED ORAL DAILY
Qty: 90 TABLET | Refills: 3 | Status: SHIPPED | OUTPATIENT
Start: 2023-04-20 | End: 2024-03-06

## 2023-04-20 RX ORDER — LEVONORGESTREL AND ETHINYL ESTRADIOL 100-20(84)
1 KIT ORAL DAILY
Qty: 91 TABLET | Refills: 3 | Status: SHIPPED | OUTPATIENT
Start: 2023-04-20 | End: 2024-04-12

## 2024-03-06 DIAGNOSIS — B00.9 HSV-2 (HERPES SIMPLEX VIRUS 2) INFECTION: ICD-10-CM

## 2024-03-06 RX ORDER — VALACYCLOVIR HYDROCHLORIDE 500 MG/1
500 TABLET, FILM COATED ORAL DAILY
Qty: 90 TABLET | Refills: 0 | Status: SHIPPED | OUTPATIENT
Start: 2024-03-06

## 2024-04-12 ENCOUNTER — TELEPHONE (OUTPATIENT)
Dept: OBGYN | Facility: CLINIC | Age: 24
End: 2024-04-12

## 2024-04-12 DIAGNOSIS — N94.6 DYSMENORRHEA: ICD-10-CM

## 2024-04-12 RX ORDER — LEVONORGESTREL AND ETHINYL ESTRADIOL 100-20(84)
1 KIT ORAL DAILY
Qty: 91 TABLET | Refills: 3 | Status: SHIPPED | OUTPATIENT
Start: 2024-04-12

## 2024-04-12 NOTE — TELEPHONE ENCOUNTER
----- Message from Lucrecia Wesley RN sent at 4/12/2024  4:10 PM CDT -----  Are you able to call Linda and get her set up for an annual/pap? We sent in a short term fill of her valacyclovir and are processing her birth control refill, but she'll need to come for a visit for further fills of her valacyclovir.

## 2024-04-12 NOTE — TELEPHONE ENCOUNTER
Received request for levonorgest-eth estrad 91-day (LOSEASONIQUE). Pt last seen in clinic 10/22. See RN contraceptive protocol below.    For women meeting the following criteria, hormonal contraceptives may be refilled annually for up to 3 years:  -Age between 15-40? y  -Non-smoker? y  -Up-to-date on pap/HPV? N - has not gotten pap done since turning 22  -Any major medical comorbidities? n  -Any history of migraine with aura? n     Unable to fill per RN protocol. Routing to providers for review.

## 2024-06-16 ENCOUNTER — HEALTH MAINTENANCE LETTER (OUTPATIENT)
Age: 24
End: 2024-06-16

## 2025-06-21 ENCOUNTER — HEALTH MAINTENANCE LETTER (OUTPATIENT)
Age: 25
End: 2025-06-21